# Patient Record
Sex: FEMALE | Race: WHITE | NOT HISPANIC OR LATINO | Employment: UNEMPLOYED | URBAN - NONMETROPOLITAN AREA
[De-identification: names, ages, dates, MRNs, and addresses within clinical notes are randomized per-mention and may not be internally consistent; named-entity substitution may affect disease eponyms.]

---

## 2017-02-07 ENCOUNTER — OFFICE VISIT (OUTPATIENT)
Dept: MEDICAL GROUP | Facility: CLINIC | Age: 62
End: 2017-02-07

## 2017-02-07 VITALS
DIASTOLIC BLOOD PRESSURE: 80 MMHG | HEIGHT: 67 IN | RESPIRATION RATE: 16 BRPM | OXYGEN SATURATION: 96 % | SYSTOLIC BLOOD PRESSURE: 130 MMHG | TEMPERATURE: 98.4 F | WEIGHT: 201.75 LBS | HEART RATE: 94 BPM | BODY MASS INDEX: 31.66 KG/M2

## 2017-02-07 DIAGNOSIS — Z78.9 POOR HISTORIAN: ICD-10-CM

## 2017-02-07 DIAGNOSIS — Z12.39 SCREENING FOR BREAST CANCER: ICD-10-CM

## 2017-02-07 DIAGNOSIS — I10 ESSENTIAL HYPERTENSION: ICD-10-CM

## 2017-02-07 DIAGNOSIS — E11.9 TYPE 2 DIABETES MELLITUS WITHOUT COMPLICATION, WITHOUT LONG-TERM CURRENT USE OF INSULIN (HCC): ICD-10-CM

## 2017-02-07 DIAGNOSIS — Z76.89 ESTABLISHING CARE WITH NEW DOCTOR, ENCOUNTER FOR: ICD-10-CM

## 2017-02-07 DIAGNOSIS — Z12.11 SCREEN FOR COLON CANCER: ICD-10-CM

## 2017-02-07 PROCEDURE — 99204 OFFICE O/P NEW MOD 45 MIN: CPT | Performed by: NURSE PRACTITIONER

## 2017-02-07 RX ORDER — HYDROCHLOROTHIAZIDE 25 MG/1
25 TABLET ORAL DAILY
Qty: 90 TAB | Refills: 1 | Status: SHIPPED | OUTPATIENT
Start: 2017-02-07 | End: 2017-08-31 | Stop reason: SDUPTHER

## 2017-02-07 RX ORDER — HYDROCHLOROTHIAZIDE 25 MG/1
25 TABLET ORAL DAILY
COMMUNITY
End: 2017-02-07 | Stop reason: SDUPTHER

## 2017-02-07 RX ORDER — LOSARTAN POTASSIUM 100 MG/1
100 TABLET ORAL DAILY
Qty: 90 TAB | Refills: 1 | Status: SHIPPED | OUTPATIENT
Start: 2017-02-07 | End: 2017-08-31 | Stop reason: SDUPTHER

## 2017-02-07 ASSESSMENT — PATIENT HEALTH QUESTIONNAIRE - PHQ9: CLINICAL INTERPRETATION OF PHQ2 SCORE: 2
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
## 2017-02-07 NOTE — ASSESSMENT & PLAN NOTE
"Patient is a poor historian throughout appointment. She often goes off on inappropriate and unreleated tangents when attempting to discuss medical history. 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 Reports that she is not to be followed by an oncologist, reports \"I am the only person to have her survive this.\" Her surgical history is also unclear. When prompted to know what her goal is for today's appointment, patient does not have a clear answer, however, it does appear she does need refills of her current medications. She does not currently have insurance, she plans to attend a Medicaid information session today. She adamantly denies any history of drug abuse or alcohol abuse. Also denies any psych history.  "
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
## 2017-02-07 NOTE — PATIENT INSTRUCTIONS
Please obtain fasting labs prior to your next appointment. You may report to our clinic here in [place] Monday-Friday from 7:00am - 9:00am.     Please arrive fasting. Please do not eat or drink anything other than water for 8 hours prior to your arrival for labs.     Your medical care was provided today by: CARLOS Ayala    Thank You for the opportunity to serve you.    You may receive a brief survey in the mail shortly regarding your visit today. Please take a few moments to complete the survey and return it; no postage is necessary. We are working to serve our patient population better, improve customer service and our patients overall experience and your input can help us to accomplish this. We thank you for your help and for the opportunity to serve you today and in the future.     Special Instructions:  Always call 9-1-1 immediately if you develop a life threatening emergency.    Unless told otherwise please take all medications as directed and complete prescription therapies.     Watch for the following signs that require additional evaluation: progressive lethargy or unresponsiveness, localized pain (chest, abdomen), shortness of breath, painful breathing, progressive vomiting with weakness, bloody stools, or new rash.     If you are prescribed pain medication or any other medication that is sedating, do not take medication before or while operating a vehicle or heavy machinery or equipment due to potential side effects such as drowsiness and/or dizziness.
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
## 2017-02-07 NOTE — ASSESSMENT & PLAN NOTE
Patient initially reported she was taking metformin 1000 mg twice daily as a result of her history of lymphoma, however, patient then reported later on appointment that she has been diagnosed with type 2 diabetes due to high blood sugars. She has been taking metformin 1000 mg twice a day for several years without complication. Denies any GI upset. Reports good medication compliance. She is in need of a refill today.

## 2017-05-29 ENCOUNTER — APPOINTMENT (OUTPATIENT)
Dept: RADIOLOGY | Facility: MEDICAL CENTER | Age: 62
End: 2017-05-29
Attending: EMERGENCY MEDICINE
Payer: COMMERCIAL

## 2017-05-29 ENCOUNTER — HOSPITAL ENCOUNTER (EMERGENCY)
Facility: MEDICAL CENTER | Age: 62
End: 2017-05-29
Attending: EMERGENCY MEDICINE
Payer: COMMERCIAL

## 2017-05-29 VITALS
HEIGHT: 68 IN | WEIGHT: 169 LBS | RESPIRATION RATE: 18 BRPM | SYSTOLIC BLOOD PRESSURE: 148 MMHG | DIASTOLIC BLOOD PRESSURE: 85 MMHG | HEART RATE: 91 BPM | OXYGEN SATURATION: 95 % | TEMPERATURE: 97.6 F | BODY MASS INDEX: 25.61 KG/M2

## 2017-05-29 DIAGNOSIS — S93.401A SPRAIN OF RIGHT ANKLE, UNSPECIFIED LIGAMENT, INITIAL ENCOUNTER: ICD-10-CM

## 2017-05-29 PROCEDURE — 73630 X-RAY EXAM OF FOOT: CPT | Mod: RT

## 2017-05-29 PROCEDURE — 99284 EMERGENCY DEPT VISIT MOD MDM: CPT

## 2017-05-29 PROCEDURE — 73610 X-RAY EXAM OF ANKLE: CPT | Mod: RT

## 2017-05-29 ASSESSMENT — PAIN SCALES - GENERAL: PAINLEVEL_OUTOF10: 9

## 2017-05-29 ASSESSMENT — LIFESTYLE VARIABLES: DO YOU DRINK ALCOHOL: NO

## 2017-05-29 NOTE — ED AVS SNAPSHOT
Home Care Instructions                                                                                                                Belkis Edward   MRN: [id number]    Department:  [facility], Emergency Dept   Date of Visit:  5/29/2017            [facility], Emergency Dept    [address]    [place] [address]    Phone:  [phone]      You were seen by     Scarlett Lezama M.D.      Your Diagnosis Was     Sprain of right ankle, unspecified ligament, initial encounter     S93.401A       Follow-up Information     1. Follow up with Aubrey Garcia M.D.. Call in 1 day.    Specialty:  Orthopaedics    Why:  for recheck    Contact information    [address]  [address]  [facility] [address]  [phone]        Medication Information     Review all of your home medications and newly ordered medications with your primary doctor and/or pharmacist as soon as possible. Follow medication instructions as directed by your doctor and/or pharmacist.     Please keep your complete medication list with you and share with your physician. Update the information when medications are discontinued, doses are changed, or new medications (including over-the-counter products) are added; and carry medication information at all times in the event of emergency situations.               Medication List      ASK your doctor about these medications        Instructions    Morning Afternoon Evening Bedtime    hydrochlorothiazide 25 MG Tabs   Commonly known as:  HYDRODIURIL        Take 1 Tab by mouth every day.   Dose:  25 mg                        losartan 100 MG Tabs   Commonly known as:  COZAAR        Take 1 Tab by mouth every day.   Dose:  100 mg                        metformin 1000 MG tablet   Commonly known as:  GLUCOPHAGE        Take 1 Tab by mouth 2 times a day, with meals.   Dose:  1000 mg                                Procedures and tests performed during your visit     DX-ANKLE 3+ VIEWS RIGHT    DX-FOOT-COMPLETE 3+ RIGHT    NURSING COMMUNICATION        Discharge Instructions       Ankle Sprain  An ankle sprain is an injury to the strong, fibrous tissues (ligaments) that hold the bones of your ankle joint together.   CAUSES  An ankle sprain is usually caused by a fall or by twisting your ankle. Ankle sprains most commonly occur when you step on the outer edge of your foot, and your ankle turns inward. People who participate in sports are more prone to these types of injuries.   SYMPTOMS   · Pain in your ankle. The pain may be present at rest or only when you are trying to stand or walk.  · Swelling.  · Bruising. Bruising may develop immediately or within 1 to 2 days after your injury.  · Difficulty standing or walking, particularly when turning corners or changing directions.  DIAGNOSIS   Your caregiver will ask you details about your injury and perform a physical exam of your ankle to determine if you have an ankle sprain. During the physical exam, your caregiver will press on and apply pressure to specific areas of your foot and ankle. Your caregiver will try to move your ankle in certain ways. An X-ray exam may be done to be sure a bone was not broken or a ligament did not separate from one of the bones in your ankle (avulsion fracture).   TREATMENT   Certain types of braces can help stabilize your ankle. Your caregiver can make a recommendation for this. Your caregiver may recommend the use of medicine for pain. If your sprain is severe, your caregiver may refer you to a surgeon who helps to restore function to parts of your skeletal system (orthopedist) or a physical therapist.  HOME CARE INSTRUCTIONS   · Apply ice to your injury for 1-2 days or as directed by your caregiver. Applying ice helps to reduce inflammation and pain.  ¨ Put ice in a plastic bag.  ¨ Place a towel between your skin and the bag.  ¨ Leave the ice on for 15-20 minutes at a time, every 2 hours while you are awake.  · Only take  over-the-counter or prescription medicines for pain, discomfort, or fever as directed by your caregiver.  · Elevate your injured ankle above the level of your heart as much as possible for 2-3 days.  · If your caregiver recommends crutches, use them as instructed. Gradually put weight on the affected ankle. Continue to use crutches or a cane until you can walk without feeling pain in your ankle.  · If you have a plaster splint, wear the splint as directed by your caregiver. Do not rest it on anything harder than a pillow for the first 24 hours. Do not put weight on it. Do not get it wet. You may take it off to take a shower or bath.  · You may have been given an elastic bandage to wear around your ankle to provide support. If the elastic bandage is too tight (you have numbness or tingling in your foot or your foot becomes cold and blue), adjust the bandage to make it comfortable.  · If you have an air splint, you may blow more air into it or let air out to make it more comfortable. You may take your splint off at night and before taking a shower or bath. Wiggle your toes in the splint several times per day to decrease swelling.  SEEK MEDICAL CARE IF:   · You have rapidly increasing bruising or swelling.  · Your toes feel extremely cold or you lose feeling in your foot.  · Your pain is not relieved with medicine.  SEEK IMMEDIATE MEDICAL CARE IF:  · Your toes are numb or blue.  · You have severe pain that is increasing.  MAKE SURE YOU:   · Understand these instructions.  · Will watch your condition.  · Will get help right away if you are not doing well or get worse.     This information is not intended to replace advice given to you by your health care provider. Make sure you discuss any questions you have with your health care provider.     Document Released: 12/18/2006 Document Revised: 01/08/2016 Document Reviewed: 12/29/2012  [employer] Interactive Patient Education ©2016 [employer]            Patient Information      Patient Information    Following emergency treatment: all patient requiring follow-up care must return either to a private physician or a clinic if your condition worsens before you are able to obtain further medical attention, please return to the emergency room.     Billing Information    At [facility], we work to make the billing process streamlined for our patients.  Our Representatives are here to answer any questions you may have regarding your hospital bill.  If you have insurance coverage and have supplied your insurance information to us, we will submit a claim to your insurer on your behalf.  Should you have any questions regarding your bill, we can be reached online or by phone as follows:  Online: You are able pay your bills online or live chat with our representatives about any billing questions you may have. We are here to help Monday - Friday from 8:00am to 7:30pm and 9:00am - 12:00pm on Saturdays.  Please visit [email or web address]  for more information.   Phone:  [phone] or [phone]    Please note that your emergency physician, surgeon, pathologist, radiologist, anesthesiologist, and other specialists are not employed by [facility] and will therefore bill separately for their services.  Please contact them directly for any questions concerning their bills at the numbers below:     Emergency Physician Services:  [phone]  [facility]:  [phone]  [facility]:  [phone]  [facility]:  [phone]    1. Your final bill may vary from the amount quoted upon discharge if all procedures are not complete at that time, or if your doctor has additional procedures of which we are not aware. You will receive an additional bill if you return to the Emergency Department at [facility] for suture removal regardless of the facility of which the sutures were placed.     2. Please arrange for settlement of  this account at the emergency registration.    3. All self-pay accounts are due in full at the time of treatment.  If you are unable to meet this obligation then payment is expected within 4-5 days.     4. If you have had radiology studies (CT, X-ray, Ultrasound, MRI), you have received a preliminary result during your emergency department visit. Please contact the radiology department [phone] to receive a copy of your final result. Please discuss the Final result with your primary physician or with the follow up physician provided.     Crisis Hotline:  [place] Crisis Hotline:  1-203-SLCPTPI or 1-408.749.8864  [place] Crisis Hotline:    1-625.364.1716 or 345-386-6554         ED Discharge Follow Up Questions    1. In order to provide you with very good care, we would like to follow up with a phone call in the next few days.  May we have your permission to contact you?     YES /  NO    2. What is the best phone number to call you? (       )_____-__________    3. What is the best time to call you?      Morning  /  Afternoon  /  Evening                   Patient Signature:  ____________________________________________________________    Date:  ____________________________________________________________

## 2017-05-29 NOTE — ED AVS SNAPSHOT
Finicity Access Code: [email or web address]  Expires: 6/28/2017 11:00 PM    Your email address is not on file at [facility].  Email Addresses are required for you to sign up for Finicity, please contact [phone] to verify your personal information and to provide your email address prior to attempting to register for Finicity.    Belkis Edward  [address] [place] [id number]    Finicity  A secure, online tool to manage your health information     [facility]’s Finicity® is a secure, online tool that connects you to your personalized health information from the privacy of your home -- day or night - making it very easy for you to manage your healthcare. Once the activation process is completed, you can even access your medical information using the Finicity ted, which is available for free in the Apple Ted store or Google Play store.     To learn more about Finicity, visit [email or web address]    There are two levels of access available (as shown below):   My Chart Features  [facility] Primary Care Doctor [facility]  Specialists [facility]  Urgent  Care Non-[facility] Primary Care Doctor   Email your healthcare team securely and privately 24/7 X X X    Manage appointments: schedule your next appointment; view details of past/upcoming appointments X      Request prescription refills. X      View recent personal medical records, including lab and immunizations X X X X   View health record, including health history, allergies, medications X X X X   Read reports about your outpatient visits, procedures, consult and ER notes X X X X   See your discharge summary, which is a recap of your hospital and/or ER visit that includes your diagnosis, lab results, and care plan X X  X     How to register for [employer]:  Once your e-mail address has been verified, follow the following steps to sign up for Finicity.     1. Go to  [email or web address]  2. Click on the Sign Up Now box, which takes you to the New Member Sign Up page.  You will need to provide the following information:  a. Enter your [employer] Access Code exactly as it appears at the top of this page. (You will not need to use this code after you’ve completed the sign-up process. If you do not sign up before the expiration date, you must request a new code.)   b. Enter your date of birth.   c. Enter your home email address.   d. Click Submit, and follow the next screen’s instructions.  3. Create a Healthonomyt ID. This will be your Nominum login ID and cannot be changed, so think of one that is secure and easy to remember.  4. Create a Nominum password. You can change your password at any time.  5. Enter your Password Reset Question and Answer. This can be used at a later time if you forget your password.   6. Enter your e-mail address. This allows you to receive e-mail notifications when new information is available in Nominum.  7. Click Sign Up. You can now view your health information.    For assistance activating your Nominum account, call [phone]

## 2017-05-29 NOTE — ED AVS SNAPSHOT
5/29/2017    Belkis Edward  [address] [address]    Dear Belkis:    [facility] wants to ensure your discharge home is safe and you or your loved ones have had all of your questions answered regarding your care after you leave the hospital.    Below is a list of resources and contact information should you have any questions regarding your hospital stay, follow-up instructions, or active medical symptoms.    Questions or Concerns Regarding… Contact   Medical Questions Related to Your Discharge  (7 days a week, 8am-5pm) Contact a Nurse Care Coordinator   [phone]   Medical Questions Not Related to Your Discharge  (24 hours a day / 7 days a week)  Contact the Nurse Health Line   [phone]    Medications or Discharge Instructions Refer to your discharge packet   or contact your [facility] Primary Care Provider   [phone]   Follow-up Appointment(s) Schedule your appointment via [employer]   or contact Scheduling [phone]   Billing Review your statement via [employer]  or contact Billing [phone]   Medical Records Review your records via [employer]   or contact Medical Records [phone]     You may receive a telephone call within two days of discharge. This call is to make certain you understand your discharge instructions and have the opportunity to have any questions answered. You can also easily access your medical information, test results and upcoming appointments via the [employer] free online health management tool. You can learn more and sign up at [employer]. For assistance setting up your [employer] account, please call [phone].    Once again, we want to ensure your discharge home is safe and that you have a clear understanding of any next steps in your care. If you have any questions or concerns, please do not hesitate to contact us, we are here for you. Thank you for choosing [facility] for your healthcare needs.    Sincerely,    Your [facility] Healthcare Team

## 2017-05-30 NOTE — ED NOTES
Patient was educated on discharge instructions.  Patient was informed about diagnosis, symptom management, risks, and home care instructions.  Patient verbalized understanding and signed discharge instructions.  Copy of discharge instructions in chart.  Patient wheeled by RN and family.  Patient has personal belongings, crutches and splint.

## 2017-05-30 NOTE — DISCHARGE INSTRUCTIONS
Ankle Sprain  An ankle sprain is an injury to the strong, fibrous tissues (ligaments) that hold the bones of your ankle joint together.   CAUSES  An ankle sprain is usually caused by a fall or by twisting your ankle. Ankle sprains most commonly occur when you step on the outer edge of your foot, and your ankle turns inward. People who participate in sports are more prone to these types of injuries.   SYMPTOMS   · Pain in your ankle. The pain may be present at rest or only when you are trying to stand or walk.  · Swelling.  · Bruising. Bruising may develop immediately or within 1 to 2 days after your injury.  · Difficulty standing or walking, particularly when turning corners or changing directions.  DIAGNOSIS   Your caregiver will ask you details about your injury and perform a physical exam of your ankle to determine if you have an ankle sprain. During the physical exam, your caregiver will press on and apply pressure to specific areas of your foot and ankle. Your caregiver will try to move your ankle in certain ways. An X-ray exam may be done to be sure a bone was not broken or a ligament did not separate from one of the bones in your ankle (avulsion fracture).   TREATMENT   Certain types of braces can help stabilize your ankle. Your caregiver can make a recommendation for this. Your caregiver may recommend the use of medicine for pain. If your sprain is severe, your caregiver may refer you to a surgeon who helps to restore function to parts of your skeletal system (orthopedist) or a physical therapist.  HOME CARE INSTRUCTIONS   · Apply ice to your injury for 1-2 days or as directed by your caregiver. Applying ice helps to reduce inflammation and pain.  ¨ Put ice in a plastic bag.  ¨ Place a towel between your skin and the bag.  ¨ Leave the ice on for 15-20 minutes at a time, every 2 hours while you are awake.  · Only take over-the-counter or prescription medicines for pain, discomfort, or fever as directed by  your caregiver.  · Elevate your injured ankle above the level of your heart as much as possible for 2-3 days.  · If your caregiver recommends crutches, use them as instructed. Gradually put weight on the affected ankle. Continue to use crutches or a cane until you can walk without feeling pain in your ankle.  · If you have a plaster splint, wear the splint as directed by your caregiver. Do not rest it on anything harder than a pillow for the first 24 hours. Do not put weight on it. Do not get it wet. You may take it off to take a shower or bath.  · You may have been given an elastic bandage to wear around your ankle to provide support. If the elastic bandage is too tight (you have numbness or tingling in your foot or your foot becomes cold and blue), adjust the bandage to make it comfortable.  · If you have an air splint, you may blow more air into it or let air out to make it more comfortable. You may take your splint off at night and before taking a shower or bath. Wiggle your toes in the splint several times per day to decrease swelling.  SEEK MEDICAL CARE IF:   · You have rapidly increasing bruising or swelling.  · Your toes feel extremely cold or you lose feeling in your foot.  · Your pain is not relieved with medicine.  SEEK IMMEDIATE MEDICAL CARE IF:  · Your toes are numb or blue.  · You have severe pain that is increasing.  MAKE SURE YOU:   · Understand these instructions.  · Will watch your condition.  · Will get help right away if you are not doing well or get worse.     This information is not intended to replace advice given to you by your health care provider. Make sure you discuss any questions you have with your health care provider.     Document Released: 12/18/2006 Document Revised: 01/08/2016 Document Reviewed: 12/29/2012  [employer] Interactive Patient Education ©2016 [employer]

## 2017-05-30 NOTE — ED NOTES
Belkis Edward  Chief Complaint   Patient presents with   • Ankle Injury     pt twisted ankle yesterday,  redness and swelling noted.  pain with wt bearing, decreased sensation and movement.       Pt to triage in w/c with above complaint. VSS.    Pt returned to lobby, educated on triage process, and to inform staff of any changes or concerns.

## 2017-05-30 NOTE — ED NOTES
Pt c/o right ankle pain after twisting it yesterday.  Pt states there is numbness on right toes.  Pain is described as 8/10 constant, sharp, and aching.  Pt has minimal movement of right toes.  R medial ankle is edematous, pulses equal bilaterally.

## 2017-05-30 NOTE — ED PROVIDER NOTES
ED Provider Note    Scribed for Scarlett Lezama M.D. by Graeme Barron. 5/29/2017  10:07 PM    Primary care provider: JEWELL Zhao  Means of arrival: Wheel Chair  History obtained from: Patient  History limited by: None    CHIEF COMPLAINT  Chief Complaint   Patient presents with   • Ankle Injury     pt twisted ankle yesterday,  redness and swelling noted.  pain with wt bearing, decreased sensation and movement.         HPI  Belkis Edward is a 61 y.o. female who presents to the Emergency Department due to constant right ankle pain after twisting her ankle yesterday. Per nurse's notes, her ankle pain is an 8/10 in severity and described as sharp and aching in nature. Per patient, she noticed swelling tonight after walking around [employer]. She also reports mild right foot pain and right knee pain onset today. Per patient, she has difficulties ambulating tonight secondary to the pain. Per nurse's notes, the patient has associated symptoms of numbness to her right toes. The patient is allergic to most antibiotics and reports taking hydrochlorothiazide, metformin, and losartan on a regular basis. She does not report any alleviating factors and denies taking pain medication. The patient does not want to be treated with pain medication. Denies any other musculoskeletal injuries.      REVIEW OF SYSTEMS  Neuro: Numbness to right toes.   Musculoskeletal: Right ankle pain and swelling, right foot pain, and right knee pain. No other musculoskeletal injuries.     See history of present illness.   E    PAST MEDICAL HISTORY   has a past medical history of Lymphoma (CMS-HCC); Headaches due to old head injury; Diabetes (CMS-HCC); and Hypertension.    SURGICAL HISTORY   has past surgical history that includes abdominal exploration (2015) and hysterectomy, total abdominal.    SOCIAL HISTORY  Social History   Substance Use Topics   • Smoking status: Former Smoker -- 2 years     Types: Cigarettes     Quit date: 01/01/1997  "  • Smokeless tobacco: Never Used   • Alcohol Use: No      History   Drug Use No       FAMILY HISTORY  Family History   Problem Relation Age of Onset   • Cancer Mother    • Alzheimer's Disease Father    • Cancer Brother        CURRENT MEDICATIONS  Home Medications     Reviewed by Ramonita Casiano R.N. (Registered Nurse) on 05/29/17 at 2158  Med List Status: Partial    Medication Last Dose Status    hydrochlorothiazide (HYDRODIURIL) 25 MG Tab  Active    losartan (COZAAR) 100 MG Tab  Active    metformin (GLUCOPHAGE) 1000 MG tablet  Active                ALLERGIES  Allergies   Allergen Reactions   • Sulfa Drugs Anaphylaxis   • Pcn [Penicillins] Anaphylaxis       PHYSICAL EXAM  VITAL SIGNS: /98 mmHg  Pulse 98  Temp(Src) 36.4 °C (97.6 °F)  Resp 16  Ht 1.727 m (5' 8\")  Wt 76.658 kg (169 lb)  BMI 25.70 kg/m2  SpO2 95%    Constitutional: No distress  Skin: pink warm and dry with slight lateral ankle contusion  Musculoskeletal: Soft tissue swelling to right ankle and right foot, no obvious bony step offs or crepitance, No right knee swelling or tenderness no ligamentous instability on ligamentous stress  Vascular: warm to touch good capillary refill   Neurologic: distally neurovascularly intact  Psychiatric: Affect normal    DIAGNOSTIC STUDIES/PROCEDURES    RADIOLOGY  DX-FOOT-COMPLETE 3+ RIGHT   Final Result      No evidence of acute fracture or dislocation.      Calcaneal spurring.      DX-ANKLE 3+ VIEWS RIGHT   Final Result      No evidence of fracture or dislocation.      Calcaneal spurring.      Soft tissue swelling.        The radiologist's interpretation of all radiological studies have been reviewed by me.    COURSE & MEDICAL DECISION MAKING  Pertinent Labs & Imaging studies reviewed. (See chart for details)    10:07 PM - Patient seen and examined at bedside. Ordered right ankle x-ray and right foot x-ray to evaluate her symptoms. Differential diagnoses include but are not limited to fracture versus " "sprain.    10:53 PM- Reviewed x-rays, which are overall unremarkable.     10:54 PM - Re-examined; The patient is resting in bed comfortably. I discussed her above findings were overall unremarkable. I discussed plans for discharge and instructed patient to keep her foot elevate and use ice. She was given a referral to Dr. Garcia (Ortho) and instructed to return to the ED if her symptoms worsen. Patient understands and agrees. Her vitals prior to discharge are: /98 mmHg  Pulse 98  Temp(Src) 36.4 °C (97.6 °F)  Resp 16  Ht 1.727 m (5' 8\")  Wt 76.658 kg (169 lb)  BMI 25.70 kg/m2  SpO2 95%    The patient will return for new or worsening symptoms and is stable at the time of discharge.    DISPOSITION:  Patient will be discharged home in stable condition.    FOLLOW UP:  Aubrey Garcia M.D.  [address] [facility] [address]  [address]  [facility] [address]  [phone]    Call in 1 day  for recheck      OUTPATIENT MEDICATIONS:  New Prescriptions    No medications on file         FINAL IMPRESSION  1. Sprain of right ankle, unspecified ligament, initial encounter          IGraeme (Jeffibe), am scribing for, and in the presence of, Scarlett Lezama M.D..    Electronically signed by: Graeme Barron (Jeffibyeny), 5/29/2017    Scarlett ZIEGLER M.D. personally performed the services described in this documentation, as scribed by Graeme Barron in my presence, and it is both accurate and complete.      The note accurately reflects work and decisions made by me.  Scarlett Lezama  5/29/2017  11:12 PM          "

## 2017-08-31 ENCOUNTER — OFFICE VISIT (OUTPATIENT)
Dept: URGENT CARE | Facility: PHYSICIAN GROUP | Age: 62
End: 2017-08-31

## 2017-08-31 ENCOUNTER — OFFICE VISIT (OUTPATIENT)
Dept: MEDICAL GROUP | Facility: CLINIC | Age: 62
End: 2017-08-31

## 2017-08-31 ENCOUNTER — APPOINTMENT (OUTPATIENT)
Dept: RADIOLOGY | Facility: IMAGING CENTER | Age: 62
End: 2017-08-31
Attending: PHYSICIAN ASSISTANT

## 2017-08-31 VITALS
BODY MASS INDEX: 29.7 KG/M2 | WEIGHT: 196 LBS | SYSTOLIC BLOOD PRESSURE: 132 MMHG | TEMPERATURE: 97.4 F | HEIGHT: 68 IN | DIASTOLIC BLOOD PRESSURE: 84 MMHG | RESPIRATION RATE: 18 BRPM | OXYGEN SATURATION: 96 % | HEART RATE: 82 BPM

## 2017-08-31 VITALS
WEIGHT: 196 LBS | SYSTOLIC BLOOD PRESSURE: 164 MMHG | HEART RATE: 88 BPM | BODY MASS INDEX: 29.7 KG/M2 | HEIGHT: 68 IN | DIASTOLIC BLOOD PRESSURE: 98 MMHG | RESPIRATION RATE: 16 BRPM | TEMPERATURE: 97.9 F | OXYGEN SATURATION: 98 %

## 2017-08-31 DIAGNOSIS — R05.9 COUGH: ICD-10-CM

## 2017-08-31 DIAGNOSIS — R09.81 NASAL CONGESTION: ICD-10-CM

## 2017-08-31 DIAGNOSIS — R40.0 SLEEPINESS: ICD-10-CM

## 2017-08-31 DIAGNOSIS — R07.1 INSPIRATORY PAIN: ICD-10-CM

## 2017-08-31 DIAGNOSIS — Z78.9 POOR HISTORIAN: ICD-10-CM

## 2017-08-31 DIAGNOSIS — R06.02 SOB (SHORTNESS OF BREATH): ICD-10-CM

## 2017-08-31 PROBLEM — Z76.89 ESTABLISHING CARE WITH NEW DOCTOR, ENCOUNTER FOR: Status: RESOLVED | Noted: 2017-02-07 | Resolved: 2017-08-31

## 2017-08-31 PROCEDURE — 71020 DX-CHEST-2 VIEWS: CPT | Mod: TC | Performed by: PHYSICIAN ASSISTANT

## 2017-08-31 PROCEDURE — 99213 OFFICE O/P EST LOW 20 MIN: CPT | Performed by: PHYSICIAN ASSISTANT

## 2017-08-31 PROCEDURE — 99213 OFFICE O/P EST LOW 20 MIN: CPT | Performed by: NURSE PRACTITIONER

## 2017-08-31 RX ORDER — LOSARTAN POTASSIUM 100 MG/1
100 TABLET ORAL DAILY
Qty: 90 TAB | Refills: 0 | Status: SHIPPED | OUTPATIENT
Start: 2017-08-31 | End: 2018-03-29

## 2017-08-31 RX ORDER — HYDROCHLOROTHIAZIDE 25 MG/1
25 TABLET ORAL DAILY
Qty: 90 TAB | Refills: 0 | Status: SHIPPED | OUTPATIENT
Start: 2017-08-31 | End: 2017-11-08 | Stop reason: SDUPTHER
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
## 2017-08-31 NOTE — ASSESSMENT & PLAN NOTE
Patient reports she was recently .     This is a new problem.  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 Denies any SOB, fever. Reports she may have been exposed to a mold in her new husbands home?     Reports she was previously treated at [facility], where she reports she was told 'we have a machine that keeps running until it finds the answer. '     Today she reports she needs a Z-yun to cure her illness. Her symptoms are unclear even after speaking with patient for over 30 minutes.

## 2017-08-31 NOTE — PATIENT INSTRUCTIONS
Your medical care was provided today by: CARLOS Ayala    Thank You for the opportunity to serve you.    You may receive a brief survey in the mail shortly regarding your visit today. Please take a few moments to complete the survey and return it; no postage is necessary. We are working to serve our patient population better, improve customer service and our patients overall experience and your input can help us to accomplish this. We thank you for your help and for the opportunity to serve you today and in the future.     Special Instructions:  Always call 9-1-1 immediately if you develop a life threatening emergency.    Unless told otherwise please take all medications as directed and complete prescription therapies.     Watch for the following signs that require additional evaluation: progressive lethargy or unresponsiveness, localized pain (chest, abdomen), shortness of breath, painful breathing, progressive vomiting with weakness, bloody stools, or new rash.     If you are prescribed pain medication or any other medication that is sedating, do not take medication before or while operating a vehicle or heavy machinery or equipment due to potential side effects such as drowsiness and/or dizziness.
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
## 2017-09-01 NOTE — PROGRESS NOTES
"Chief Complaint   Patient presents with   • Cough       HISTORY OF PRESENT ILLNESS: Patient is a 61 y.o. female who presents today for evaluation of possible legionnaires. Patient reports having the disease 8-10 years ago. She crawled in her house 48 hours ago and found \"rotten wood and soupy stuff.\" Patient immediately started having nasal congestion and her left eye was \"goopy.\" She reports an intermittent dry cough. She has difficulty staying awake and has had intermittent confusion over the last 12 hours that she feels is getting worse. She states \"my coordination is weird.\" She states \"I feel a fever coming on.\" She complains of shortness of breath and pain with inspiration. She denies nausea, vomiting, diarrhea, and has not taken any over-the-counter medication. She was in [place] 5 weeks ago getting  but denies any unusual activity.    Patient Active Problem List    Diagnosis Date Noted   • Cough 08/31/2017   • Essential hypertension 02/07/2017   • Type 2 diabetes mellitus without complication, without long-term current use of insulin (CMS-HCC) 02/07/2017   • Poor historian 02/07/2017       Allergies:Sulfa drugs; Levaquin; and Pcn [penicillins]    Current Outpatient Prescriptions Ordered in [facility]   Medication Sig Dispense Refill   • hydrochlorothiazide (HYDRODIURIL) 25 MG Tab Take 1 Tab by mouth every day. 90 Tab 0   • losartan (COZAAR) 100 MG Tab Take 1 Tab by mouth every day. 90 Tab 0   • metformin (GLUCOPHAGE) 1000 MG tablet Take 1 Tab by mouth 2 times a day, with meals. 180 Tab 0     No current Epic-ordered facility-administered medications on file.        Past Medical History:   Diagnosis Date   • Diabetes (CMS-HCC)    • Headaches due to old head injury    • Hypertension    • Lymphoma (CMS-HCC)     per patient, 2001, possible T-cell?       Social History   Substance Use Topics   • Smoking status: Former Smoker     Years: 2.00     Types: Cigarettes     Quit date: 1/1/1997   • Smokeless tobacco: Never " "Used   • Alcohol use No       Family Status   Relation Status   • Mother    • Father  at age 74   • Brother  at age 32     Family History   Problem Relation Age of Onset   • Cancer Mother    • Alzheimer's Disease Father    • Cancer Brother        ROS:    Review of Systems   Constitutional: Negative for fever, chills, weight loss and malaise/fatigue.   HENT: Negative for ear pain, nosebleeds, congestion, sore throat and neck pain.    Eyes: Negative for blurred vision.   Respiratory: Negative for sputum production,  and wheezing.    Cardiovascular: Negative for chest pain, palpitations, orthopnea and leg swelling.   Gastrointestinal: Negative for heartburn, nausea, vomiting and abdominal pain.   Genitourinary: Negative for dysuria, urgency and frequency.       Exam:  Blood pressure (!) 164/98, pulse 88, temperature 36.6 °C (97.9 °F), resp. rate 16, height 1.727 m (5' 8\"), weight 88.9 kg (196 lb), SpO2 98 %.  General: Well developed, well nourished. No distress.  HEENT: Conjunctiva clear, lids without ptosis, PERRL/EOMI. Ears normal shape and contour, canals are clear bilaterally, tympanic membranes are benign. Nasal mucosa benign. Oropharynx is without erythema, edema or exudates. Reasonable dentition.  Neck: Trachea midline, no masses. No thyromegaly.  Pulmonary: Clear to ausculation and percussion.  Normal effort. No rales, ronchi, or wheezing.   Cardiovascular: Regular rate and rhythm without murmur. No edema.   Abdomen: Soft, non-tender, nondistended. No hepatosplenomegaly.   Neurologic: Grossly nonfocal. Balance and coordination seem appropriate.  Skin: Warm, dry, good turgor. No rashes in visible areas.   Psych: Normal mood. Alert and oriented x3. Judgment and insight is normal.    CXR, per radiology:  Impression       No active disease     Assessment/Plan:  Chest x-ray results were not available until the day following clinic visit. The patient on the telephone regarding results. Advised " that this appears to be some sort of allergic reaction to whatever she was exposed to underneath her house. Discussed appropriate over-the-counter symptomatic medication for this. Patient is still quite concerned that she has legionnaires. Advise having blood work done and discussed ER precautions for any worsening symptoms. Patient verbalizes understanding and agrees with plan of care.  1. SOB (shortness of breath)  DX-CHEST-2 VIEWS    CBC WITH DIFFERENTIAL    COMP METABOLIC PANEL   2. Inspiratory pain  DX-CHEST-2 VIEWS    CBC WITH DIFFERENTIAL    COMP METABOLIC PANEL   3. Sleepiness  CBC WITH DIFFERENTIAL    COMP METABOLIC PANEL   4. Nasal congestion     5. Cough  CBC WITH DIFFERENTIAL    COMP METABOLIC PANEL

## 2017-11-09 RX ORDER — HYDROCHLOROTHIAZIDE 25 MG/1
TABLET ORAL
Qty: 90 TAB | Refills: 0 | Status: SHIPPED | OUTPATIENT
Start: 2017-11-09 | End: 2018-03-29

## 2018-02-17 ENCOUNTER — HOSPITAL ENCOUNTER (OUTPATIENT)
Dept: LAB | Facility: MEDICAL CENTER | Age: 63
End: 2018-02-17
Attending: NURSE PRACTITIONER
Payer: COMMERCIAL

## 2018-02-17 DIAGNOSIS — R05.9 COUGH: ICD-10-CM

## 2018-02-17 LAB
BASOPHILS # BLD AUTO: 1.3 % (ref 0–1.8)
BASOPHILS # BLD: 0.09 K/UL (ref 0–0.12)
EOSINOPHIL # BLD AUTO: 0.21 K/UL (ref 0–0.51)
EOSINOPHIL NFR BLD: 2.9 % (ref 0–6.9)
ERYTHROCYTE [DISTWIDTH] IN BLOOD BY AUTOMATED COUNT: 40.6 FL (ref 35.9–50)
HCT VFR BLD AUTO: 43.9 % (ref 37–47)
HGB BLD-MCNC: 14.4 G/DL (ref 12–16)
IMM GRANULOCYTES # BLD AUTO: 0.02 K/UL (ref 0–0.11)
IMM GRANULOCYTES NFR BLD AUTO: 0.3 % (ref 0–0.9)
LYMPHOCYTES # BLD AUTO: 2.18 K/UL (ref 1–4.8)
LYMPHOCYTES NFR BLD: 30.6 % (ref 22–41)
MCH RBC QN AUTO: 28.5 PG (ref 27–33)
MCHC RBC AUTO-ENTMCNC: 32.8 G/DL (ref 33.6–35)
MCV RBC AUTO: 86.9 FL (ref 81.4–97.8)
MONOCYTES # BLD AUTO: 0.59 K/UL (ref 0–0.85)
MONOCYTES NFR BLD AUTO: 8.3 % (ref 0–13.4)
NEUTROPHILS # BLD AUTO: 4.04 K/UL (ref 2–7.15)
NEUTROPHILS NFR BLD: 56.6 % (ref 44–72)
NRBC # BLD AUTO: 0 K/UL
NRBC BLD-RTO: 0 /100 WBC
PLATELET # BLD AUTO: 245 K/UL (ref 164–446)
PMV BLD AUTO: 11.5 FL (ref 9–12.9)
RBC # BLD AUTO: 5.05 M/UL (ref 4.2–5.4)
WBC # BLD AUTO: 7.1 K/UL (ref 4.8–10.8)

## 2018-02-17 PROCEDURE — 36415 COLL VENOUS BLD VENIPUNCTURE: CPT

## 2018-02-17 PROCEDURE — 85025 COMPLETE CBC W/AUTO DIFF WBC: CPT

## 2018-02-23 ENCOUNTER — OFFICE VISIT (OUTPATIENT)
Dept: URGENT CARE | Facility: PHYSICIAN GROUP | Age: 63
End: 2018-02-23
Payer: COMMERCIAL

## 2018-02-23 VITALS
RESPIRATION RATE: 16 BRPM | TEMPERATURE: 98.3 F | DIASTOLIC BLOOD PRESSURE: 80 MMHG | HEART RATE: 84 BPM | SYSTOLIC BLOOD PRESSURE: 122 MMHG | OXYGEN SATURATION: 97 % | HEIGHT: 68 IN | WEIGHT: 195 LBS | BODY MASS INDEX: 29.55 KG/M2

## 2018-02-23 DIAGNOSIS — Z79.4 TYPE 2 DIABETES MELLITUS WITH COMPLICATION, WITH LONG-TERM CURRENT USE OF INSULIN (HCC): ICD-10-CM

## 2018-02-23 DIAGNOSIS — I10 HYPERTENSION, UNSPECIFIED TYPE: ICD-10-CM

## 2018-02-23 DIAGNOSIS — E11.8 TYPE 2 DIABETES MELLITUS WITH COMPLICATION, WITH LONG-TERM CURRENT USE OF INSULIN (HCC): ICD-10-CM

## 2018-02-23 PROCEDURE — 99213 OFFICE O/P EST LOW 20 MIN: CPT | Performed by: EMERGENCY MEDICINE

## 2018-02-23 RX ORDER — HYDROCHLOROTHIAZIDE 25 MG/1
25 TABLET ORAL DAILY
Qty: 30 TAB | Refills: 1 | Status: SHIPPED | OUTPATIENT
Start: 2018-02-23 | End: 2018-03-29 | Stop reason: SDUPTHER

## 2018-02-23 RX ORDER — LOSARTAN POTASSIUM 100 MG/1
100 TABLET ORAL DAILY
Qty: 30 TAB | Refills: 1 | Status: SHIPPED | OUTPATIENT
Start: 2018-02-23 | End: 2018-03-29 | Stop reason: SDUPTHER

## 2018-02-23 ASSESSMENT — ENCOUNTER SYMPTOMS
SPEECH CHANGE: 0
COUGH: 0
ABDOMINAL PAIN: 0
CHILLS: 0
STRIDOR: 0
NECK PAIN: 0
NERVOUS/ANXIOUS: 0
BACK PAIN: 0
HEMOPTYSIS: 0
VOMITING: 0
SENSORY CHANGE: 0
NAUSEA: 0
EYE REDNESS: 0
FEVER: 0
HEADACHES: 0
EYE DISCHARGE: 0

## 2018-02-24 NOTE — PROGRESS NOTES
Subjective:      Belkis Montoya is a 62 y.o. female who presents with Medication Refill            HPI  Patient is a pleasant 62-year-old female who could not get her car started in time to make her appointment at 4 PM so her appointment was canceled but she needs medication refills and came to the urgent care. Patient has diabetes and hypertension.    Allergies   Allergen Reactions   • Sulfa Drugs Anaphylaxis   • Levaquin    • Pcn [Penicillins] Anaphylaxis     Social History     Social History   • Marital status: Single     Spouse name: N/A   • Number of children: N/A   • Years of education: N/A     Occupational History   • Not on file.     Social History Main Topics   • Smoking status: Former Smoker     Years: 2.00     Types: Cigarettes     Quit date: 1/1/1997   • Smokeless tobacco: Never Used   • Alcohol use No   • Drug use: No   • Sexual activity: Yes     Partners: Male     Other Topics Concern   • Not on file     Social History Narrative   • No narrative on file     Past Medical History:   Diagnosis Date   • Diabetes (CMS-HCC)    • Headaches due to old head injury    • Hypertension    • Lymphoma (CMS-HCC)     per patient, 2001, possible T-cell?     Current Outpatient Prescriptions on File Prior to Visit   Medication Sig Dispense Refill   • metformin (GLUCOPHAGE) 1000 MG tablet TAKE ONE TABLET BY MOUTH TWICE DAILY WITH MEALS 180 Tab 0   • hydrochlorothiazide (HYDRODIURIL) 25 MG Tab TAKE ONE TABLET BY MOUTH ONCE DAILY 90 Tab 0   • losartan (COZAAR) 100 MG Tab Take 1 Tab by mouth every day. 90 Tab 0     No current facility-administered medications on file prior to visit.    family history includes Alzheimer's Disease in her father; Cancer in her brother and mother.  Review of Systems   Constitutional: Negative for chills and fever.   Eyes: Negative for discharge and redness.   Respiratory: Negative for cough, hemoptysis and stridor.    Cardiovascular: Negative for chest pain.   Gastrointestinal: Negative for  "abdominal pain, nausea and vomiting.   Musculoskeletal: Negative for back pain and neck pain.   Skin: Negative for rash.   Neurological: Negative for sensory change, speech change and headaches.   Psychiatric/Behavioral: The patient is not nervous/anxious.           Objective:     /80   Pulse 84   Temp 36.8 °C (98.3 °F)   Resp 16   Ht 1.727 m (5' 8\")   Wt 88.5 kg (195 lb)   SpO2 97%   BMI 29.65 kg/m²      Physical Exam   Constitutional: She appears well-developed and well-nourished. No distress.   HENT:   Head: Normocephalic and atraumatic.   Right Ear: External ear normal.   Cardiovascular: Normal rate.    Pulmonary/Chest: Effort normal.   Abdominal: She exhibits no distension.   Musculoskeletal: Normal range of motion.   Neurological: She is alert. Coordination normal.   Skin: Skin is warm and dry. She is not diaphoretic.   Psychiatric: She has a normal mood and affect.               Assessment/Plan:     1. Type 2 diabetes mellitus with complication, with long-term current use of insulin (CMS-HCC)    - metformin (GLUCOPHAGE) 1000 MG tablet; Take 1 Tab by mouth 2 times a day, with meals.  Dispense: 60 Tab; Refill: 1    2. Hypertension, unspecified type    - hydroCHLOROthiazide (HYDRODIURIL) 25 MG Tab; Take 1 Tab by mouth every day.  Dispense: 30 Tab; Refill: 1  - losartan (COZAAR) 100 MG Tab; Take 1 Tab by mouth every day.  Dispense: 30 Tab; Refill: 1    Patient was given enough medications for a month plus a refill on her metformin, hydrochlorothiazide and losartan.  "

## 2018-03-29 ENCOUNTER — HOSPITAL ENCOUNTER (OUTPATIENT)
Dept: LAB | Facility: MEDICAL CENTER | Age: 63
End: 2018-03-29
Attending: NURSE PRACTITIONER
Payer: COMMERCIAL

## 2018-03-29 ENCOUNTER — OFFICE VISIT (OUTPATIENT)
Dept: MEDICAL GROUP | Facility: PHYSICIAN GROUP | Age: 63
End: 2018-03-29
Payer: COMMERCIAL

## 2018-03-29 ENCOUNTER — TELEPHONE (OUTPATIENT)
Dept: MEDICAL GROUP | Facility: PHYSICIAN GROUP | Age: 63
End: 2018-03-29

## 2018-03-29 ENCOUNTER — APPOINTMENT (OUTPATIENT)
Dept: RADIOLOGY | Facility: IMAGING CENTER | Age: 63
End: 2018-03-29
Attending: NURSE PRACTITIONER
Payer: COMMERCIAL

## 2018-03-29 VITALS
HEART RATE: 82 BPM | RESPIRATION RATE: 16 BRPM | HEIGHT: 68 IN | OXYGEN SATURATION: 97 % | TEMPERATURE: 98.1 F | BODY MASS INDEX: 28.79 KG/M2 | SYSTOLIC BLOOD PRESSURE: 138 MMHG | DIASTOLIC BLOOD PRESSURE: 64 MMHG | WEIGHT: 190 LBS

## 2018-03-29 DIAGNOSIS — Z78.9 POOR HISTORIAN: ICD-10-CM

## 2018-03-29 DIAGNOSIS — Z79.4 TYPE 2 DIABETES MELLITUS WITH COMPLICATION, WITH LONG-TERM CURRENT USE OF INSULIN (HCC): ICD-10-CM

## 2018-03-29 DIAGNOSIS — I10 HYPERTENSION, UNSPECIFIED TYPE: ICD-10-CM

## 2018-03-29 DIAGNOSIS — M54.2 NECK PAIN: ICD-10-CM

## 2018-03-29 DIAGNOSIS — E11.8 TYPE 2 DIABETES MELLITUS WITH COMPLICATION, WITH LONG-TERM CURRENT USE OF INSULIN (HCC): ICD-10-CM

## 2018-03-29 DIAGNOSIS — I10 ESSENTIAL HYPERTENSION: ICD-10-CM

## 2018-03-29 DIAGNOSIS — E11.9 TYPE 2 DIABETES MELLITUS WITHOUT COMPLICATION, WITHOUT LONG-TERM CURRENT USE OF INSULIN (HCC): ICD-10-CM

## 2018-03-29 PROCEDURE — 99214 OFFICE O/P EST MOD 30 MIN: CPT | Performed by: NURSE PRACTITIONER

## 2018-03-29 PROCEDURE — 72040 X-RAY EXAM NECK SPINE 2-3 VW: CPT | Mod: TC,FY | Performed by: NURSE PRACTITIONER

## 2018-03-29 RX ORDER — LOSARTAN POTASSIUM 100 MG/1
100 TABLET ORAL DAILY
Qty: 90 TAB | Refills: 1 | Status: SHIPPED | OUTPATIENT
Start: 2018-03-29 | End: 2018-03-29

## 2018-03-29 RX ORDER — HYDROCHLOROTHIAZIDE 25 MG/1
TABLET ORAL
Qty: 90 TAB | Refills: 1 | Status: SHIPPED | OUTPATIENT
Start: 2018-03-29 | End: 2018-03-29

## 2018-03-29 RX ORDER — LOSARTAN POTASSIUM 100 MG/1
100 TABLET ORAL DAILY
Qty: 30 TAB | Refills: 1 | Status: SHIPPED | OUTPATIENT
Start: 2018-03-29 | End: 2018-10-04 | Stop reason: SDUPTHER

## 2018-03-29 RX ORDER — HYDROCHLOROTHIAZIDE 25 MG/1
25 TABLET ORAL DAILY
Qty: 30 TAB | Refills: 1 | Status: SHIPPED | OUTPATIENT
Start: 2018-03-29 | End: 2018-10-04 | Stop reason: SDUPTHER

## 2018-03-29 NOTE — ASSESSMENT & PLAN NOTE
Patient reports this to be a chronic condition, status of control unknown. She is on metformin 1000 mg twice daily but has felt to have her labs done on 2 separate occasions when the labs were ordered since establishing care with the [facility] system in February 2017. Labs were again ordered and she is to have these done before she follows up with me in 4-6 weeks. She tolerates the medication well with no significant bothersome side effects and does need refills. She reports to measuring her fasting blood glucose on a daily basis and states they are normally in the 80s to 90s.
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
## 2018-03-29 NOTE — ASSESSMENT & PLAN NOTE
This is a chronic condition, stable and fairly well-controlled with current medications. She is on losartan and hydrochlorothiazide for which she needs refills. She is due for labs, these were ordered. She tolerates both medications well with no significant bothersome side effects and she does deny symptoms of hypertension.

## 2018-03-29 NOTE — TELEPHONE ENCOUNTER
Please let patient know that x-ray of her cervical spine does show moderate osteoarthritis and degenerative disc disease which is likely causing her pain. We'll see how she does with physical therapy and anti-inflammatories. Interventions do not improve her symptoms, she may need referral to specialist.

## 2018-03-29 NOTE — TELEPHONE ENCOUNTER
Called [phone] voice mail not set up at this time.   Called [phone] left voice message for patient to call [phone] to go over provider notes.
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
## 2018-03-29 NOTE — ASSESSMENT & PLAN NOTE
Pt continues to be a poor historian with regard to her health history and has not provided adequate outside records--please see previous notes in EMR. She is unable give provider names in [place] where she was treated for us to request records. 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 Surgical history is unclear. She does continue to go off on inappropriate and unrelated tangents during visit and requires redirection to focus on goal of the visit. I told her that it is very important that she provides us with her outside records so that we can adequately care for her. I also expressed importance of having her labs done.

## 2018-03-30 ENCOUNTER — HOSPITAL ENCOUNTER (OUTPATIENT)
Dept: LAB | Facility: MEDICAL CENTER | Age: 63
End: 2018-03-30
Attending: NURSE PRACTITIONER
Payer: COMMERCIAL

## 2018-03-30 DIAGNOSIS — I10 ESSENTIAL HYPERTENSION: ICD-10-CM

## 2018-03-30 DIAGNOSIS — E11.9 TYPE 2 DIABETES MELLITUS WITHOUT COMPLICATION, WITHOUT LONG-TERM CURRENT USE OF INSULIN (HCC): ICD-10-CM

## 2018-03-30 LAB
ALBUMIN SERPL BCP-MCNC: 3.7 G/DL (ref 3.2–4.9)
ALBUMIN/GLOB SERPL: 1.3 G/DL
ALP SERPL-CCNC: 63 U/L (ref 30–99)
ALT SERPL-CCNC: 14 U/L (ref 2–50)
ANION GAP SERPL CALC-SCNC: 8 MMOL/L (ref 0–11.9)
AST SERPL-CCNC: 16 U/L (ref 12–45)
BILIRUB SERPL-MCNC: 0.5 MG/DL (ref 0.1–1.5)
BUN SERPL-MCNC: 17 MG/DL (ref 8–22)
CALCIUM SERPL-MCNC: 9 MG/DL (ref 8.5–10.5)
CHLORIDE SERPL-SCNC: 105 MMOL/L (ref 96–112)
CHOLEST SERPL-MCNC: 206 MG/DL (ref 100–199)
CO2 SERPL-SCNC: 27 MMOL/L (ref 20–33)
CREAT SERPL-MCNC: 0.69 MG/DL (ref 0.5–1.4)
CREAT UR-MCNC: 124.5 MG/DL
EST. AVERAGE GLUCOSE BLD GHB EST-MCNC: 197 MG/DL
GLOBULIN SER CALC-MCNC: 2.9 G/DL (ref 1.9–3.5)
GLUCOSE SERPL-MCNC: 195 MG/DL (ref 65–99)
HBA1C MFR BLD: 8.5 % (ref 0–5.6)
HDLC SERPL-MCNC: 43 MG/DL
LDLC SERPL CALC-MCNC: 102 MG/DL
MICROALBUMIN UR-MCNC: <0.7 MG/DL
MICROALBUMIN/CREAT UR: NORMAL MG/G (ref 0–30)
POTASSIUM SERPL-SCNC: 3.9 MMOL/L (ref 3.6–5.5)
PROT SERPL-MCNC: 6.6 G/DL (ref 6–8.2)
SODIUM SERPL-SCNC: 140 MMOL/L (ref 135–145)
TRIGL SERPL-MCNC: 304 MG/DL (ref 0–149)

## 2018-03-30 PROCEDURE — 36415 COLL VENOUS BLD VENIPUNCTURE: CPT

## 2018-03-30 PROCEDURE — 80061 LIPID PANEL: CPT

## 2018-03-30 PROCEDURE — 80053 COMPREHEN METABOLIC PANEL: CPT

## 2018-03-30 PROCEDURE — 82570 ASSAY OF URINE CREATININE: CPT

## 2018-03-30 PROCEDURE — 82043 UR ALBUMIN QUANTITATIVE: CPT

## 2018-03-30 PROCEDURE — 83036 HEMOGLOBIN GLYCOSYLATED A1C: CPT

## 2018-06-07 ENCOUNTER — OFFICE VISIT (OUTPATIENT)
Dept: URGENT CARE | Facility: PHYSICIAN GROUP | Age: 63
End: 2018-06-07
Payer: COMMERCIAL

## 2018-06-07 VITALS
HEART RATE: 84 BPM | HEIGHT: 68 IN | BODY MASS INDEX: 27.89 KG/M2 | DIASTOLIC BLOOD PRESSURE: 68 MMHG | RESPIRATION RATE: 16 BRPM | SYSTOLIC BLOOD PRESSURE: 128 MMHG | OXYGEN SATURATION: 97 % | TEMPERATURE: 98.9 F | WEIGHT: 184 LBS

## 2018-06-07 DIAGNOSIS — L98.9 SKIN SORE: ICD-10-CM

## 2018-06-07 PROCEDURE — 99213 OFFICE O/P EST LOW 20 MIN: CPT | Performed by: NURSE PRACTITIONER

## 2018-06-07 ASSESSMENT — ENCOUNTER SYMPTOMS
MYALGIAS: 0
VOMITING: 0
TINGLING: 0
SENSORY CHANGE: 0
DIARRHEA: 0
FEVER: 0
NAUSEA: 0
FOCAL WEAKNESS: 0
COUGH: 0
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
## 2018-10-04 ENCOUNTER — OFFICE VISIT (OUTPATIENT)
Dept: MEDICAL GROUP | Facility: PHYSICIAN GROUP | Age: 63
End: 2018-10-04
Payer: MEDICARE

## 2018-10-04 VITALS
BODY MASS INDEX: 30.64 KG/M2 | RESPIRATION RATE: 14 BRPM | DIASTOLIC BLOOD PRESSURE: 84 MMHG | HEIGHT: 67 IN | OXYGEN SATURATION: 97 % | HEART RATE: 92 BPM | WEIGHT: 195.2 LBS | SYSTOLIC BLOOD PRESSURE: 136 MMHG | TEMPERATURE: 97.8 F

## 2018-10-04 DIAGNOSIS — Z79.4 TYPE 2 DIABETES MELLITUS WITH COMPLICATION, WITH LONG-TERM CURRENT USE OF INSULIN (HCC): ICD-10-CM

## 2018-10-04 DIAGNOSIS — Z78.9 POOR HISTORIAN: ICD-10-CM

## 2018-10-04 DIAGNOSIS — F43.9 STRESS AT HOME: ICD-10-CM

## 2018-10-04 DIAGNOSIS — I10 ESSENTIAL HYPERTENSION: ICD-10-CM

## 2018-10-04 DIAGNOSIS — E11.8 TYPE 2 DIABETES MELLITUS WITH COMPLICATION, WITH LONG-TERM CURRENT USE OF INSULIN (HCC): ICD-10-CM

## 2018-10-04 DIAGNOSIS — R07.89 OTHER CHEST PAIN: ICD-10-CM

## 2018-10-04 DIAGNOSIS — E11.9 TYPE 2 DIABETES MELLITUS WITHOUT COMPLICATION, WITHOUT LONG-TERM CURRENT USE OF INSULIN (HCC): ICD-10-CM

## 2018-10-04 PROBLEM — R05.9 COUGH: Status: RESOLVED | Noted: 2017-08-31 | Resolved: 2018-10-04

## 2018-10-04 LAB
HBA1C MFR BLD: 7.9 % (ref ?–5.8)
INT CON NEG: NORMAL
INT CON POS: NORMAL

## 2018-10-04 PROCEDURE — 99214 OFFICE O/P EST MOD 30 MIN: CPT | Performed by: NURSE PRACTITIONER

## 2018-10-04 PROCEDURE — 83036 HEMOGLOBIN GLYCOSYLATED A1C: CPT | Performed by: NURSE PRACTITIONER

## 2018-10-04 RX ORDER — LOSARTAN POTASSIUM 100 MG/1
100 TABLET ORAL DAILY
Qty: 90 TAB | Refills: 3 | Status: SHIPPED | OUTPATIENT
Start: 2018-10-04 | End: 2019-06-26

## 2018-10-04 RX ORDER — HYDROCHLOROTHIAZIDE 25 MG/1
25 TABLET ORAL DAILY
Qty: 90 TAB | Refills: 3 | Status: SHIPPED | OUTPATIENT
Start: 2018-10-04 | End: 2019-06-26 | Stop reason: SDUPTHER

## 2018-10-04 NOTE — ASSESSMENT & PLAN NOTE
Patient reports increased stress at home.  She is in the process of a divorce and having to move out of her home.  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  I did offer to refer to counseling or start SSRI.  Patient declined.  She would like to work on meditation.  Denies SI/HI.

## 2018-10-04 NOTE — ASSESSMENT & PLAN NOTE
This is a chronic health problem that is well controlled with current medications and lifestyle measures.  Takes losartan 100 mg daily and hydrochlorothiazide 25 mg daily.  Tolerating medication, denies cough or lightheadedness.  Has had intermittent chest pain recently. See additional note under chest pain. Blood pressure at home has been running in 130's/70's.

## 2018-10-04 NOTE — ASSESSMENT & PLAN NOTE
Patient reports intermittent chest pain for the last several months.  She describes it as an electrical shock in the left side of her chest.  She reports occasional pain in her left arm and jaw.  Reports during episodes of chest pain that she does not have diaphoresis, nausea, or shortness of breath.  Reports pain is aggravated by lifting.  Alleviating factors are stretching and meditating.  She is not currently having chest pain.  Point-of-care EKG shows normal sinus rhythm.  Patient does admit that she has been under a lot of stress recently.  During exam pain is reproduced with left arm abduction.  This is likely muscular.  Did offer to get x-ray of shoulder.  Patient would like to hold off for now.  Strict ER precautions reviewed.
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
## 2018-10-04 NOTE — ASSESSMENT & PLAN NOTE
Patient continues to be poor historian in regard to her health history and answering questions related to her health concerns.  During exam she often brought up on related topics.

## 2018-10-04 NOTE — ASSESSMENT & PLAN NOTE
This is a chronic health problem that is uncontrolled with current medications and lifestyle measures.  POC A1C is 7.9 today.  Home fasting blood sugar readings have been ranging from 107-122.  Patient takes metformin 1000 mg twice a day.  A1c today is improved from last reading of 8.5.  Discussed with patient that the goal is for her A1c to be below 7.  Discussed options on how to achieve this including diet, routine exercise, weight loss, adding another medication.  Patient would like to work on lifestyle measures before considering adding another medication.  She will work on low carbohydrate diet, exercise, and weight loss.  She will follow-up in clinic with diabetic nurse educator and myself in 3 months.  She will get labs done prior to appointment.  Denies numbness and tingling, dysuria, polyuria, vision changes.

## 2019-02-28 ENCOUNTER — TELEPHONE (OUTPATIENT)
Dept: MEDICAL GROUP | Facility: PHYSICIAN GROUP | Age: 64
End: 2019-02-28

## 2019-02-28 NOTE — TELEPHONE ENCOUNTER
1. Caller Name: Belkis      Call Back Number: [phone] (home)         Patient approves a detailed voicemail message: no      Patient states that she is experiencing bilateral leg swelling and would like to be seen by PCP. Patient is scheduled to see PCP 3/4/19. 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 Patient also states that she feels like her BP maybe a bit higher than normal. Advised patient that if she feels like she needs to be sooner she can head into Urgent Care to be evaluated sooner. Went over medications with patient and advised her she is not due for a refill at this time.

## 2019-03-04 ENCOUNTER — OFFICE VISIT (OUTPATIENT)
Dept: MEDICAL GROUP | Facility: PHYSICIAN GROUP | Age: 64
End: 2019-03-04
Payer: MEDICARE

## 2019-03-04 VITALS
BODY MASS INDEX: 30.76 KG/M2 | HEART RATE: 95 BPM | TEMPERATURE: 97.9 F | SYSTOLIC BLOOD PRESSURE: 138 MMHG | RESPIRATION RATE: 16 BRPM | WEIGHT: 196 LBS | OXYGEN SATURATION: 98 % | HEIGHT: 67 IN | DIASTOLIC BLOOD PRESSURE: 70 MMHG

## 2019-03-04 DIAGNOSIS — F43.9 STRESS AT HOME: ICD-10-CM

## 2019-03-04 DIAGNOSIS — E11.9 TYPE 2 DIABETES MELLITUS WITHOUT COMPLICATION, WITHOUT LONG-TERM CURRENT USE OF INSULIN (HCC): ICD-10-CM

## 2019-03-04 DIAGNOSIS — K21.9 GASTROESOPHAGEAL REFLUX DISEASE, ESOPHAGITIS PRESENCE NOT SPECIFIED: ICD-10-CM

## 2019-03-04 DIAGNOSIS — I10 ESSENTIAL HYPERTENSION: ICD-10-CM

## 2019-03-04 DIAGNOSIS — M79.89 SOFT TISSUE MASS: ICD-10-CM

## 2019-03-04 DIAGNOSIS — R07.89 OTHER CHEST PAIN: ICD-10-CM

## 2019-03-04 PROCEDURE — 99214 OFFICE O/P EST MOD 30 MIN: CPT | Performed by: NURSE PRACTITIONER

## 2019-03-04 RX ORDER — OMEPRAZOLE 20 MG/1
20 CAPSULE, DELAYED RELEASE ORAL DAILY
Qty: 90 CAP | Refills: 3 | Status: SHIPPED | OUTPATIENT
Start: 2019-03-04 | End: 2019-06-26

## 2019-03-04 NOTE — PATIENT INSTRUCTIONS
Have you start omeprazole, 20 mg daily on empty stomach, first thing in am    Have your labs done as ordered at your last visit--they are fasting.    See me again in 6-8 weeks, sooner if needed    ER precautions    US of soft tissue on L leg
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
## 2019-03-04 NOTE — ASSESSMENT & PLAN NOTE
Pt has episodic chest pain off and on for the past several months, she was seen for this before--EKG in office was normal, no evidence of ischemia. It is brought on by stress. 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 She has been taking OTC antacids that stops the pain.   Her last episode was preceded by epistaxis. She feels her BP is quite elevated, but was normal in office today. She reports the highest BP she had at home was 140/100.   Patient also reports to me that this pain improves with antacids such as Tums.  She does not report any other associated symptoms such as shortness of breath, diaphoresis, peripheral edema or near syncope.  I did discuss with her that I do not feel this chest pain is cardiac in nature but more related to acid reflux since her symptoms improved with antacids.  We will have her start omeprazole 20 mg daily, she was advised to take this on an empty stomach apart from other medications.  She was also advised to avoid aggravating foods and activities.  We also discussed the importance of stress control, she states she is going to work on this through meditation and yoga, she declines pharmacotherapy for this.

## 2019-03-05 NOTE — ASSESSMENT & PLAN NOTE
This is a chronic condition, status of control unknown as she did not have labs done.  She is currently on metformin 1000 mg twice daily.  She is appropriately on ARB but not on statin.  Her last known A1c was 8.5%, we discussed at her last visit that we would like to get this below 7.  We discussed several treatment options including medication.  She is going to have her labs done before she follows up with me at her next appointment.
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
## 2019-03-05 NOTE — ASSESSMENT & PLAN NOTE
This is a chronic condition, stable and fairly well controlled on current medication.  She takes hydrochlorothiazide 25 mg daily and losartan 100 mg daily.  She does report increased blood pressure as of late that she is undergoing quite a bit of stress at home.  She states that her blood pressure has been elevated at 140s over 100s, however it is normal today at 138/70.  She does continue to have intermittent atypical chest pain, however she was recently seen for this and EKG in office was negative (see additional notes).

## 2019-03-09 ENCOUNTER — HOSPITAL ENCOUNTER (OUTPATIENT)
Dept: RADIOLOGY | Facility: MEDICAL CENTER | Age: 64
End: 2019-03-09
Attending: NURSE PRACTITIONER
Payer: MEDICARE

## 2019-03-09 DIAGNOSIS — M79.89 SOFT TISSUE MASS: ICD-10-CM

## 2019-03-09 PROCEDURE — 76882 US LMTD JT/FCL EVL NVASC XTR: CPT | Mod: LT

## 2019-03-11 ENCOUNTER — TELEPHONE (OUTPATIENT)
Dept: MEDICAL GROUP | Facility: PHYSICIAN GROUP | Age: 64
End: 2019-03-11

## 2019-03-11 NOTE — TELEPHONE ENCOUNTER
Phone Number Called: [phone]    Message: Patient notified. Patient is stating that its now behind her knee and swollen. Not hot to touch or no redness. Patient is staying of leg and she will start to elevate    Left Message for patient to call back: N\A

## 2019-03-11 NOTE — TELEPHONE ENCOUNTER
----- Message from JEWELL Hood sent at 3/11/2019  9:39 AM PDT -----  Please let pt know that US was negative--this most likely is a muscle

## 2019-04-10 ENCOUNTER — APPOINTMENT (OUTPATIENT)
Dept: MEDICAL GROUP | Facility: PHYSICIAN GROUP | Age: 64
End: 2019-04-10
Payer: MEDICARE

## 2019-05-13 DIAGNOSIS — Z79.4 TYPE 2 DIABETES MELLITUS WITH COMPLICATION, WITH LONG-TERM CURRENT USE OF INSULIN (HCC): ICD-10-CM

## 2019-05-13 DIAGNOSIS — E11.8 TYPE 2 DIABETES MELLITUS WITH COMPLICATION, WITH LONG-TERM CURRENT USE OF INSULIN (HCC): ICD-10-CM

## 2019-05-14 NOTE — TELEPHONE ENCOUNTER
Patient has recently been seen by PCP within the last 6 months per protocol (3/19). Will refill medications for 6 months.  Lab Results   Component Value Date/Time    HBA1C 7.9 10/04/2018 09:30 AM      Lab Results   Component Value Date/Time    MALBCRT see below 03/30/2018 06:37 AM    MICROALBUR <0.7 03/30/2018 06:37 AM      Lab Results   Component Value Date/Time    ALKPHOSPHAT 63 03/30/2018 06:38 AM    ASTSGOT 16 03/30/2018 06:38 AM    ALTSGPT 14 03/30/2018 06:38 AM    TBILIRUBIN 0.5 03/30/2018 06:38 AM

## 2019-05-14 NOTE — TELEPHONE ENCOUNTER
Was the patient seen in the last year in this department? Yes    Does patient have an active prescription for medications requested? No     Received Request Via: Pharmacy    Pt met protocol?: Yes     Last OV 03/04/19    Lab Results   Component Value Date/Time    HBA1C 7.9 10/04/2018 09:30 AM        Lab Results  Component Value Date/Time   CHOLSTRLTOT 206 (H) 03/30/2018 0638   TRIGLYCERIDE 304 (H) 03/30/2018 0638   HDL 43 03/30/2018 0638    (H) 03/30/2018 0638

## 2019-05-17 ENCOUNTER — TELEPHONE (OUTPATIENT)
Dept: MEDICAL GROUP | Facility: PHYSICIAN GROUP | Age: 64
End: 2019-05-17

## 2019-05-18 NOTE — TELEPHONE ENCOUNTER
Phone Number Called: [phone] (home)     Call outcome: left message for patient to call back regarding message below    Message: Spoke with patient earlier today and she was having issues with her medications. I called [facility] and they state that patient has refills on all her medications. They will get them ready for her to  st her convenience. Left voice message regarding this on pt telephone. Advised her that if she still has any issues to give our office a call on Monday.

## 2019-05-20 ENCOUNTER — HOSPITAL ENCOUNTER (OUTPATIENT)
Dept: LAB | Facility: MEDICAL CENTER | Age: 64
End: 2019-05-20
Attending: NURSE PRACTITIONER
Payer: MEDICARE

## 2019-05-20 DIAGNOSIS — Z79.4 TYPE 2 DIABETES MELLITUS WITH COMPLICATION, WITH LONG-TERM CURRENT USE OF INSULIN (HCC): ICD-10-CM

## 2019-05-20 DIAGNOSIS — E11.8 TYPE 2 DIABETES MELLITUS WITH COMPLICATION, WITH LONG-TERM CURRENT USE OF INSULIN (HCC): ICD-10-CM

## 2019-05-20 LAB
ALBUMIN SERPL BCP-MCNC: 3.8 G/DL (ref 3.2–4.9)
ALBUMIN/GLOB SERPL: 1.4 G/DL
ALP SERPL-CCNC: 88 U/L (ref 30–99)
ALT SERPL-CCNC: 12 U/L (ref 2–50)
ANION GAP SERPL CALC-SCNC: 9 MMOL/L (ref 0–11.9)
AST SERPL-CCNC: 16 U/L (ref 12–45)
BILIRUB SERPL-MCNC: 0.7 MG/DL (ref 0.1–1.5)
BUN SERPL-MCNC: 16 MG/DL (ref 8–22)
CALCIUM SERPL-MCNC: 9.5 MG/DL (ref 8.5–10.5)
CHLORIDE SERPL-SCNC: 102 MMOL/L (ref 96–112)
CHOLEST SERPL-MCNC: 240 MG/DL (ref 100–199)
CO2 SERPL-SCNC: 28 MMOL/L (ref 20–33)
CREAT SERPL-MCNC: 0.59 MG/DL (ref 0.5–1.4)
GLOBULIN SER CALC-MCNC: 2.8 G/DL (ref 1.9–3.5)
GLUCOSE SERPL-MCNC: 206 MG/DL (ref 65–99)
HDLC SERPL-MCNC: 46 MG/DL
LDLC SERPL CALC-MCNC: ABNORMAL MG/DL
POTASSIUM SERPL-SCNC: 4 MMOL/L (ref 3.6–5.5)
PROT SERPL-MCNC: 6.6 G/DL (ref 6–8.2)
SODIUM SERPL-SCNC: 139 MMOL/L (ref 135–145)
T4 FREE SERPL-MCNC: 1.05 NG/DL (ref 0.53–1.43)
TRIGL SERPL-MCNC: 443 MG/DL (ref 0–149)
TSH SERPL DL<=0.005 MIU/L-ACNC: 1.33 UIU/ML (ref 0.38–5.33)

## 2019-05-20 PROCEDURE — 82570 ASSAY OF URINE CREATININE: CPT

## 2019-05-20 PROCEDURE — 80061 LIPID PANEL: CPT

## 2019-05-20 PROCEDURE — 80053 COMPREHEN METABOLIC PANEL: CPT

## 2019-05-20 PROCEDURE — 82043 UR ALBUMIN QUANTITATIVE: CPT

## 2019-05-20 PROCEDURE — 84443 ASSAY THYROID STIM HORMONE: CPT

## 2019-05-20 PROCEDURE — 84439 ASSAY OF FREE THYROXINE: CPT

## 2019-05-20 PROCEDURE — 36415 COLL VENOUS BLD VENIPUNCTURE: CPT

## 2019-05-21 LAB
CREAT UR-MCNC: 111.8 MG/DL
MICROALBUMIN UR-MCNC: <0.7 MG/DL
MICROALBUMIN/CREAT UR: NORMAL MG/G (ref 0–30)

## 2019-05-28 ENCOUNTER — OFFICE VISIT (OUTPATIENT)
Dept: MEDICAL GROUP | Facility: PHYSICIAN GROUP | Age: 64
End: 2019-05-28
Payer: MEDICARE

## 2019-05-28 VITALS
OXYGEN SATURATION: 99 % | HEIGHT: 67 IN | HEART RATE: 94 BPM | WEIGHT: 202 LBS | SYSTOLIC BLOOD PRESSURE: 134 MMHG | BODY MASS INDEX: 31.71 KG/M2 | DIASTOLIC BLOOD PRESSURE: 80 MMHG | TEMPERATURE: 97.5 F | RESPIRATION RATE: 16 BRPM

## 2019-05-28 DIAGNOSIS — Z78.9 POOR HISTORIAN: ICD-10-CM

## 2019-05-28 DIAGNOSIS — I10 ESSENTIAL HYPERTENSION: ICD-10-CM

## 2019-05-28 DIAGNOSIS — E78.2 MIXED HYPERLIPIDEMIA: ICD-10-CM

## 2019-05-28 DIAGNOSIS — E11.9 TYPE 2 DIABETES MELLITUS WITHOUT COMPLICATION, WITHOUT LONG-TERM CURRENT USE OF INSULIN (HCC): ICD-10-CM

## 2019-05-28 DIAGNOSIS — F43.9 STRESS AT HOME: ICD-10-CM

## 2019-05-28 LAB
HBA1C MFR BLD: 9.6 % (ref 0–5.6)
INT CON NEG: ABNORMAL
INT CON POS: ABNORMAL

## 2019-05-28 PROCEDURE — 99214 OFFICE O/P EST MOD 30 MIN: CPT | Performed by: NURSE PRACTITIONER

## 2019-05-28 PROCEDURE — 83036 HEMOGLOBIN GLYCOSYLATED A1C: CPT | Performed by: NURSE PRACTITIONER

## 2019-05-28 RX ORDER — ROSUVASTATIN CALCIUM 20 MG/1
20 TABLET, COATED ORAL EVERY EVENING
Qty: 90 TAB | Refills: 1 | Status: SHIPPED | OUTPATIENT
Start: 2019-05-28 | End: 2019-06-26

## 2019-05-28 RX ORDER — PIOGLITAZONEHYDROCHLORIDE 30 MG/1
30 TABLET ORAL DAILY
Qty: 90 TAB | Refills: 1 | Status: SHIPPED | OUTPATIENT
Start: 2019-05-28 | End: 2019-06-26

## 2019-05-28 ASSESSMENT — PATIENT HEALTH QUESTIONNAIRE - PHQ9
SUM OF ALL RESPONSES TO PHQ QUESTIONS 1-9: 12
5. POOR APPETITE OR OVEREATING: 3 - NEARLY EVERY DAY
CLINICAL INTERPRETATION OF PHQ2 SCORE: 4
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
## 2019-05-28 NOTE — ASSESSMENT & PLAN NOTE
This continues, has very high stress. Still going through domestic problems and divorce. At last visit she declines referral BH or pharmacotherapy.  She also continues to decline referral to behavioral health as well as pharmacotherapy.  She states she does have a friend that is a counselor that she has talked to in the past, she is going to reach out to him to set up visits.

## 2019-05-28 NOTE — ASSESSMENT & PLAN NOTE
The 10-year ASCVD risk score (Iram BUSBY Jr., et al., 2013) is: 17.8%    Values used to calculate the score:      Age: 63 years      Sex: Female      Is Non- : No      Diabetic: Yes      Tobacco smoker: No      Systolic Blood Pressure: 134 mmHg      Is BP treated: Yes      HDL Cholesterol: 46 mg/dL      Total Cholesterol: 240 mg/dL  Patient does agree to starting statin, she will start rosuvastatin 20 mg daily.  She will follow-up with me in 3 months with labs done before visit.  She reports to me that she is going to make lifestyle modifications and try to eat healthier and exercise regularly.

## 2019-05-29 NOTE — ASSESSMENT & PLAN NOTE
This is chronic and stable, well controlled on medications including hydrochlorothiazide and losartan.  Blood pressure today is 134/80 and she denies symptoms of hypertension.

## 2019-05-29 NOTE — ASSESSMENT & PLAN NOTE
Patient continues to be a poor historian in regards to her health history and answering questions related to her health concerns.  She continues to insist on lifestyle modifications for her health including exercise including yoga, healthy diet.  I did discuss with her the risks of not treating it conditions adequately and that while lifestyle modifications are very important other treatments are necessary.

## 2019-05-29 NOTE — ASSESSMENT & PLAN NOTE
This is a chronic condition, uncontrolled.  She is currently on metformin 1000 mg twice a day.  A1c in office today is 9.6% it was 8.5% at her last visit.  At that time she wanted to continue with lifestyle modifications and did not want to make any medication changes.  Today she does agree to adding medication, she will stay on the metformin but we will add pioglitazone 30 mg daily.  She was advised to continue with lifestyle modifications.  She is now on statin (see additional notes).  She is also appropriately on ARB.  She is going to follow-up in 3 months with labs done before visit.

## 2019-05-30 ENCOUNTER — TELEPHONE (OUTPATIENT)
Dept: MEDICAL GROUP | Facility: PHYSICIAN GROUP | Age: 64
End: 2019-05-30

## 2019-05-30 NOTE — TELEPHONE ENCOUNTER
FINAL PRIOR AUTHORIZATION STATUS:    1.  Name of Medication & Dose: rosuvastatin 20 mg     2. Prior Auth Status: Approved through 12/30/18 to 12/30/19     3. Action Taken: Pharmacy Notified: yes Patient Notified: yes

## 2019-06-03 ENCOUNTER — TELEPHONE (OUTPATIENT)
Dept: MEDICAL GROUP | Facility: PHYSICIAN GROUP | Age: 64
End: 2019-06-03

## 2019-06-03 NOTE — TELEPHONE ENCOUNTER
FINAL PRIOR AUTHORIZATION STATUS:    1.  Name of Medication & Dose: rosuvastatin     2. Prior Auth Status: Approved through 12/30/18 to 12/31/19     3. Action Taken: Pharmacy Notified: yes Patient Notified: yes

## 2019-06-26 ENCOUNTER — OFFICE VISIT (OUTPATIENT)
Dept: MEDICAL GROUP | Facility: CLINIC | Age: 64
End: 2019-06-26
Payer: MEDICARE

## 2019-06-26 VITALS
BODY MASS INDEX: 30.79 KG/M2 | HEIGHT: 67 IN | HEART RATE: 90 BPM | WEIGHT: 196.2 LBS | TEMPERATURE: 97.9 F | OXYGEN SATURATION: 98 % | RESPIRATION RATE: 14 BRPM | DIASTOLIC BLOOD PRESSURE: 72 MMHG | SYSTOLIC BLOOD PRESSURE: 110 MMHG

## 2019-06-26 DIAGNOSIS — E11.9 TYPE 2 DIABETES MELLITUS WITHOUT COMPLICATION, WITHOUT LONG-TERM CURRENT USE OF INSULIN (HCC): ICD-10-CM

## 2019-06-26 DIAGNOSIS — Z12.39 BREAST CANCER SCREENING: ICD-10-CM

## 2019-06-26 DIAGNOSIS — M79.652 PAIN OF LEFT THIGH: ICD-10-CM

## 2019-06-26 DIAGNOSIS — I10 ESSENTIAL HYPERTENSION: ICD-10-CM

## 2019-06-26 PROBLEM — E66.3 OVERWEIGHT: Status: ACTIVE | Noted: 2019-06-26

## 2019-06-26 PROBLEM — K21.9 ACID REFLUX: Status: RESOLVED | Noted: 2019-03-04 | Resolved: 2019-06-26

## 2019-06-26 PROBLEM — R07.89 OTHER CHEST PAIN: Status: RESOLVED | Noted: 2018-10-04 | Resolved: 2019-06-26

## 2019-06-26 PROBLEM — M54.2 NECK PAIN: Status: RESOLVED | Noted: 2018-03-29 | Resolved: 2019-06-26

## 2019-06-26 PROCEDURE — 99214 OFFICE O/P EST MOD 30 MIN: CPT | Performed by: FAMILY MEDICINE

## 2019-06-26 RX ORDER — HYDROCHLOROTHIAZIDE 25 MG/1
25 TABLET ORAL DAILY
Qty: 90 TAB | Refills: 1 | Status: SHIPPED | OUTPATIENT
Start: 2019-06-26 | End: 2020-01-08

## 2019-06-26 NOTE — ASSESSMENT & PLAN NOTE
Took herself off her Actos. Thinks she can lose weight, improve her BS by going back on vegan diet. On metformin 1 g bid. Does not monitor her BS. No recent retinal exam. Denies any tingling or numbness in extretities. No rashes.
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
## 2019-07-31 ENCOUNTER — APPOINTMENT (OUTPATIENT)
Dept: URGENT CARE | Facility: PHYSICIAN GROUP | Age: 64
End: 2019-07-31
Payer: MEDICARE

## 2019-07-31 ENCOUNTER — APPOINTMENT (OUTPATIENT)
Dept: RADIOLOGY | Facility: IMAGING CENTER | Age: 64
End: 2019-07-31
Attending: FAMILY MEDICINE
Payer: MEDICARE

## 2019-07-31 ENCOUNTER — OFFICE VISIT (OUTPATIENT)
Dept: MEDICAL GROUP | Facility: CLINIC | Age: 64
End: 2019-07-31
Payer: MEDICARE

## 2019-07-31 VITALS
HEIGHT: 69 IN | BODY MASS INDEX: 29.03 KG/M2 | RESPIRATION RATE: 14 BRPM | SYSTOLIC BLOOD PRESSURE: 118 MMHG | OXYGEN SATURATION: 100 % | TEMPERATURE: 98.1 F | WEIGHT: 196 LBS | HEART RATE: 96 BPM | DIASTOLIC BLOOD PRESSURE: 72 MMHG

## 2019-07-31 DIAGNOSIS — M25.562 CHRONIC PAIN OF LEFT KNEE: ICD-10-CM

## 2019-07-31 DIAGNOSIS — G89.29 CHRONIC PAIN OF LEFT KNEE: ICD-10-CM

## 2019-07-31 DIAGNOSIS — E11.9 TYPE 2 DIABETES MELLITUS WITHOUT COMPLICATION, WITHOUT LONG-TERM CURRENT USE OF INSULIN (HCC): ICD-10-CM

## 2019-07-31 PROCEDURE — 73562 X-RAY EXAM OF KNEE 3: CPT | Mod: TC,LT | Performed by: FAMILY MEDICINE

## 2019-07-31 PROCEDURE — 99213 OFFICE O/P EST LOW 20 MIN: CPT | Performed by: FAMILY MEDICINE

## 2019-07-31 RX ORDER — LOSARTAN POTASSIUM 50 MG/1
100 TABLET ORAL
Refills: 1 | COMMUNITY
Start: 2019-05-20 | End: 2019-08-08 | Stop reason: SDUPTHER

## 2019-07-31 RX ORDER — NAPROXEN 500 MG/1
500 TABLET ORAL
Qty: 30 TAB | Refills: 2 | Status: SHIPPED | OUTPATIENT
Start: 2019-07-31 | End: 2019-09-25
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
## 2019-07-31 NOTE — ASSESSMENT & PLAN NOTE
Pain around left knee radiating up into the thigh at night, very uncomfortable. No knee swelling. Never had an XR.

## 2019-08-02 ENCOUNTER — TELEPHONE (OUTPATIENT)
Dept: MEDICAL GROUP | Facility: PHYSICIAN GROUP | Age: 64
End: 2019-08-02

## 2019-08-05 NOTE — PROGRESS NOTES
Telciom with patient. Reviewed XR report. :atient states that pain goes from buttock on left down to heel, gets worse during course of day. Will refer to get MRI  L-spine.

## 2019-08-06 DIAGNOSIS — M54.32 SCIATICA OF LEFT SIDE: ICD-10-CM

## 2019-08-08 ENCOUNTER — TELEPHONE (OUTPATIENT)
Dept: MEDICAL GROUP | Facility: CLINIC | Age: 64
End: 2019-08-08

## 2019-08-08 DIAGNOSIS — I10 ESSENTIAL HYPERTENSION: ICD-10-CM

## 2019-08-08 RX ORDER — LOSARTAN POTASSIUM 100 MG/1
100 TABLET ORAL DAILY
Qty: 30 TAB | Refills: 5 | Status: SHIPPED | OUTPATIENT
Start: 2019-08-08

## 2019-08-08 NOTE — TELEPHONE ENCOUNTER
Pt came in and stated that she is out of her Losartan 100mg  would like to know if you could please refill it she is worried that her blood pressure is to high

## 2019-08-16 ENCOUNTER — OFFICE VISIT (OUTPATIENT)
Dept: MEDICAL GROUP | Facility: CLINIC | Age: 64
End: 2019-08-16
Payer: MEDICARE

## 2019-08-16 VITALS
HEIGHT: 68 IN | DIASTOLIC BLOOD PRESSURE: 60 MMHG | OXYGEN SATURATION: 96 % | HEART RATE: 102 BPM | WEIGHT: 198 LBS | SYSTOLIC BLOOD PRESSURE: 100 MMHG | TEMPERATURE: 98.4 F | BODY MASS INDEX: 30.01 KG/M2

## 2019-08-16 DIAGNOSIS — M79.652 PAIN OF LEFT THIGH: ICD-10-CM

## 2019-08-16 PROCEDURE — 99213 OFFICE O/P EST LOW 20 MIN: CPT | Performed by: FAMILY MEDICINE
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
## 2019-08-28 ENCOUNTER — TELEPHONE (OUTPATIENT)
Dept: MEDICAL GROUP | Facility: CLINIC | Age: 64
End: 2019-08-28

## 2019-08-28 NOTE — TELEPHONE ENCOUNTER
1. Caller Name: Pt                      Call Back Number: [phone] (home)     2. Message: Pt came into the office because she is having a hard time getting into PT  In [place] and would like to have PT referral sent to another location not in [facility].      3. Patient approves office to leave a detailed voicemail/MyChart message: no

## 2019-09-25 ENCOUNTER — OFFICE VISIT (OUTPATIENT)
Dept: MEDICAL GROUP | Facility: CLINIC | Age: 64
End: 2019-09-25
Payer: MEDICARE

## 2019-09-25 VITALS
DIASTOLIC BLOOD PRESSURE: 80 MMHG | OXYGEN SATURATION: 96 % | WEIGHT: 198 LBS | SYSTOLIC BLOOD PRESSURE: 136 MMHG | HEIGHT: 67 IN | TEMPERATURE: 97.9 F | HEART RATE: 100 BPM | BODY MASS INDEX: 31.08 KG/M2

## 2019-09-25 DIAGNOSIS — R30.0 DYSURIA: ICD-10-CM

## 2019-09-25 DIAGNOSIS — R10.2 PELVIC PAIN: ICD-10-CM

## 2019-09-25 DIAGNOSIS — M54.2 NECK PAIN: ICD-10-CM

## 2019-09-25 LAB
APPEARANCE UR: CLEAR
BILIRUB UR STRIP-MCNC: NEGATIVE MG/DL
COLOR UR AUTO: YELLOW
GLUCOSE UR STRIP.AUTO-MCNC: NORMAL MG/DL
KETONES UR STRIP.AUTO-MCNC: NEGATIVE MG/DL
LEUKOCYTE ESTERASE UR QL STRIP.AUTO: NEGATIVE
NITRITE UR QL STRIP.AUTO: NEGATIVE
PH UR STRIP.AUTO: 5 [PH] (ref 5–8)
PROT UR QL STRIP: NEGATIVE MG/DL
RBC UR QL AUTO: NEGATIVE
SP GR UR STRIP.AUTO: 1.03
UROBILINOGEN UR STRIP-MCNC: NORMAL MG/DL

## 2019-09-25 PROCEDURE — 81002 URINALYSIS NONAUTO W/O SCOPE: CPT | Performed by: FAMILY MEDICINE

## 2019-09-25 PROCEDURE — 99214 OFFICE O/P EST MOD 30 MIN: CPT | Performed by: FAMILY MEDICINE
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
## 2019-09-25 NOTE — ASSESSMENT & PLAN NOTE
Ongoing pain left side of neck to shoulder. XR's in 3/18 showed only Anterolisthesis at C5-C6 and  Moderate degenerative disc disease and facet arthropathy mainly present at C4-C7. Feels cracking in neck when she turns and bends it . No pain or ting/numbness down arms. Would like PT to address, needs extra referral.
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
## 2019-11-20 ENCOUNTER — TELEPHONE (OUTPATIENT)
Dept: MEDICAL GROUP | Facility: CLINIC | Age: 64
End: 2019-11-20

## 2019-11-20 NOTE — TELEPHONE ENCOUNTER
Pt called stating that she cannot get into any of the offices that she has been referred to. She didn't state a specific office, but demanded that we find her a new Dr. I called her, and was sent to a voice mail, that wasn't accepting new voicemails.     Thank you.

## 2019-12-26 ENCOUNTER — OFFICE VISIT (OUTPATIENT)
Dept: MEDICAL GROUP | Facility: CLINIC | Age: 64
End: 2019-12-26
Payer: MEDICARE

## 2019-12-26 VITALS
HEIGHT: 67 IN | TEMPERATURE: 98.3 F | WEIGHT: 198 LBS | DIASTOLIC BLOOD PRESSURE: 72 MMHG | BODY MASS INDEX: 31.08 KG/M2 | HEART RATE: 106 BPM | SYSTOLIC BLOOD PRESSURE: 122 MMHG | OXYGEN SATURATION: 97 % | RESPIRATION RATE: 20 BRPM

## 2019-12-26 DIAGNOSIS — E66.9 OBESITY (BMI 30-39.9): ICD-10-CM

## 2019-12-26 DIAGNOSIS — E11.9 TYPE 2 DIABETES MELLITUS WITHOUT COMPLICATION, WITHOUT LONG-TERM CURRENT USE OF INSULIN (HCC): ICD-10-CM

## 2019-12-26 DIAGNOSIS — R09.81 CONGESTION OF NASAL SINUS: ICD-10-CM

## 2019-12-26 DIAGNOSIS — Z12.11 SCREENING FOR COLON CANCER: ICD-10-CM

## 2019-12-26 PROBLEM — E66.3 OVERWEIGHT: Status: RESOLVED | Noted: 2019-06-26 | Resolved: 2019-12-26

## 2019-12-26 PROBLEM — R30.0 DYSURIA: Status: RESOLVED | Noted: 2019-09-25 | Resolved: 2019-12-26

## 2019-12-26 LAB
HBA1C MFR BLD: 10.5 % (ref 0–5.6)
INT CON NEG: ABNORMAL
INT CON POS: ABNORMAL

## 2019-12-26 PROCEDURE — 99213 OFFICE O/P EST LOW 20 MIN: CPT | Performed by: NURSE PRACTITIONER

## 2019-12-26 PROCEDURE — 83036 HEMOGLOBIN GLYCOSYLATED A1C: CPT | Performed by: NURSE PRACTITIONER

## 2019-12-26 ASSESSMENT — PAIN SCALES - GENERAL: PAINLEVEL: NO PAIN

## 2019-12-26 NOTE — PROGRESS NOTES
Subjective:     Belkis Montoya is a 64 y.o. female here today for new onset upper respiratory symptoms     Belkis is a patient of Dr. Penaloza here for an acute concern.     This is a new problem. She reports she hasn't been feeling well for 2-3 days.   Symptoms include   Cough   Congestion       Patient denies the following symptoms:  SOB   Nausea/Vomiting   Diarrhea   Chills/body aches   Sore throat   Sinus pressure   Ear pain      Onset 2-3 days    Progression since onset: improving    Fever: No   Treatments tried: OTC meds, supplements    Family members/coworker sick with similar symptoms: No     Smoker status:   Social History     Tobacco Use   Smoking Status Former Smoker   • Packs/day: 0.00   • Years: 2.00   • Pack years: 0.00   • Types: Cigarettes   • Last attempt to quit: 1997   • Years since quittin.9   Smokeless Tobacco Never Used       No problem-specific Assessment & Plan notes found for this encounter.       Current medicines (including changes today)  Current Outpatient Medications   Medication Sig Dispense Refill   • losartan (COZAAR) 100 MG Tab Take 1 Tab by mouth every day. 30 Tab 5   • hydroCHLOROthiazide (HYDRODIURIL) 25 MG Tab Take 1 Tab by mouth every day. 90 Tab 1   • metformin (GLUCOPHAGE) 1000 MG tablet TAKE 1 TABLET BY MOUTH TWICE DAILY WITH MEALS 180 Tab 1     No current facility-administered medications for this visit.        She  has a past medical history of Diabetes (HCC), Headaches due to old head injury, Hypertension, and Lymphoma (HCC).    She  has a past surgical history that includes abdominal exploration () and hysterectomy, total abdominal.     Social History     Socioeconomic History   • Marital status: Single     Spouse name: Not on file   • Number of children: Not on file   • Years of education: Not on file   • Highest education level: Not on file   Occupational History   • Not on file   Social Needs   • Financial resource strain: Not on file   • Food insecurity:  "    Worry: Not on file     Inability: Not on file   • Transportation needs:     Medical: Not on file     Non-medical: Not on file   Tobacco Use   • Smoking status: Former Smoker     Packs/day: 0.00     Years: 2.00     Pack years: 0.00     Types: Cigarettes     Last attempt to quit: 1997     Years since quittin.9   • Smokeless tobacco: Never Used   Substance and Sexual Activity   • Alcohol use: No   • Drug use: No   • Sexual activity: Yes     Partners: Male   Lifestyle   • Physical activity:     Days per week: Not on file     Minutes per session: Not on file   • Stress: Not on file   Relationships   • Social connections:     Talks on phone: Not on file     Gets together: Not on file     Attends Taoist service: Not on file     Active member of club or organization: Not on file     Attends meetings of clubs or organizations: Not on file     Relationship status: Not on file   • Intimate partner violence:     Fear of current or ex partner: Not on file     Emotionally abused: Not on file     Physically abused: Not on file     Forced sexual activity: Not on file   Other Topics Concern   • Not on file   Social History Narrative   • Not on file       Family History   Problem Relation Age of Onset   • Cancer Mother    • Alzheimer's Disease Father    • Cancer Brother          ROS  Positive for cough, intermittent; congestion   No fever, chills, weight loss.   No rash.   No eye redness, eye pain.   Negative for SOB, wheezing.    No chest pain, palpitations, dizziness.   No abdominal pain.     All other systems reviewed and are negative.        Objective:     /72 (BP Location: Right arm, Patient Position: Sitting, BP Cuff Size: Adult)   Pulse (!) 106   Temp 36.8 °C (98.3 °F) (Temporal)   Resp 20   Ht 1.702 m (5' 7\")   Wt 89.8 kg (198 lb)   SpO2 97%  Body mass index is 31.01 kg/m².    Physical Exam:   Constitutional: Alert, no distress. Patient not toxic or lethargic.   Eye: Equal, round and reactive, " conjunctiva clear, lids normal.   ENMT: Lips without lesions, oropharynx clear.   TMs normal, without erythema.   No nasal drainage, normal appearance of external nose.   No pain with palpation of sinuses   Neck: Trachea midline, no masses. No cervical or supraclavicular lymphadenopathy  Respiratory: Unlabored respiratory effort, lungs clear to auscultation, no wheezes, no ronchi.  Cardiovascular: Normal S1, S2, no murmur, no edema.   Abdomen: Soft, non-tender, no masses, no hepatosplenomegaly. Normal bowel sounds.   Skin: Warm, dry, good turgor, no rashes in visible areas.  Psych: Alert and oriented x3, normal affect and mood.        Assessment and Plan:   The following treatment plan was discussed    1. Type 2 diabetes mellitus without complication, without long-term current use of insulin (HCC)  Patient's A1c is 10.5 today.  Most recent A1c in May 2019 was 9.7.  Certainly appears to be worsening.  She is currently taking metformin 1000 mg twice daily.  Discussed with patient this may be contributing to why she is not feeling well, headache.  She is not monitoring her blood pressure.  Discussed with patient to follow-up within 1 to 2 weeks with primary care provider to discuss care of treatment for her diabetes.  She verbalized understanding.  - POCT  A1C    2. Screening for colon cancer  - OCCULT BLOOD FECES IMMUNOASSAY (FIT); Future    3. Obesity (BMI 30-39.9)  - Patient identified as having weight management issue.  Appropriate orders and counseling given.    4. Congestion of nasal sinus  Appears viral.  Discussed conservative treatment.  Good handwashing and rest.  She will return to clinic if symptoms worsen.       Advised patient to rest, increase fluids   Sinus rinse as needed for congestion   Salt water gargle for sore throat as needed   Discussed s/s to seek emergent care.  RTC if symptoms worsen or do not resolve.       Reviewed risks and benefits of treatment plan. Patient verbally agrees to plan of  care.       Followup: Return if symptoms worsen or fail to improve, for make an appt to follow up with PCP re: VALE .    MARIA ELENA [email or web address].     PLEASE NOTE: This dictation was created using voice recognition software. I have made every reasonable attempt to correct obvious errors, but I expect that there may be errors of grammar and possibly content that I did not discover prior finalizing this note.

## 2019-12-26 NOTE — PATIENT INSTRUCTIONS
Your medical care was provided today by: CARLOS Ayala    Thank You for the opportunity to serve you.    You may receive a brief survey in the mail shortly regarding your visit today. Please take a few moments to complete the survey and return it; no postage is necessary. We are working to serve our patient population better, improve customer service and our patients overall experience and your input can help us to accomplish this. We thank you for your help and for the opportunity to serve you today and in the future.     Labs and Diagnostic Testing   Please note that if we have ordered labs or diagnostic testing, those results may be released to you on [employer] prior to my review. While we do our best to review your results as soon as possible, there are times when you may see your results prior to provider review. Of course, if you ever have any questions or concerns about your results, please contact our clinic.     Special Instructions:  Always call 9-1-1 immediately if you develop a life threatening emergency.    Unless told otherwise please take all medications as directed and complete prescription therapies.     Watch for the following signs that require additional evaluation: progressive lethargy or unresponsiveness, localized pain (chest, abdomen), shortness of breath, painful breathing, progressive vomiting with weakness, bloody stools, or new rash.     If you are prescribed pain medication or any other medication that is sedating, do not take medication before or while operating a vehicle or heavy machinery or equipment due to potential side effects such as drowsiness and/or dizziness.

## 2020-01-08 ENCOUNTER — OFFICE VISIT (OUTPATIENT)
Dept: MEDICAL GROUP | Facility: CLINIC | Age: 65
End: 2020-01-08
Payer: MEDICARE

## 2020-01-08 VITALS
DIASTOLIC BLOOD PRESSURE: 72 MMHG | SYSTOLIC BLOOD PRESSURE: 114 MMHG | HEIGHT: 68 IN | TEMPERATURE: 96.9 F | WEIGHT: 199 LBS | BODY MASS INDEX: 30.16 KG/M2 | OXYGEN SATURATION: 99 % | HEART RATE: 87 BPM

## 2020-01-08 DIAGNOSIS — Z79.4 TYPE 2 DIABETES MELLITUS WITH COMPLICATION, WITH LONG-TERM CURRENT USE OF INSULIN (HCC): ICD-10-CM

## 2020-01-08 DIAGNOSIS — E11.8 TYPE 2 DIABETES MELLITUS WITH COMPLICATION, WITH LONG-TERM CURRENT USE OF INSULIN (HCC): ICD-10-CM

## 2020-01-08 DIAGNOSIS — E11.9 TYPE 2 DIABETES MELLITUS WITHOUT COMPLICATION, WITHOUT LONG-TERM CURRENT USE OF INSULIN (HCC): ICD-10-CM

## 2020-01-08 PROCEDURE — 99213 OFFICE O/P EST LOW 20 MIN: CPT | Performed by: FAMILY MEDICINE
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
## 2020-01-08 NOTE — ASSESSMENT & PLAN NOTE
Has not been taking her metformin regularly. Last A1c was 10.5. Overwhelmed with care of her friend, who has a degenerative neurological disorder, whose behavior is getting worse and requires lots of care.

## 2020-05-01 DIAGNOSIS — E11.8 TYPE 2 DIABETES MELLITUS WITH COMPLICATION, WITH LONG-TERM CURRENT USE OF INSULIN (HCC): ICD-10-CM

## 2020-05-01 DIAGNOSIS — Z79.4 TYPE 2 DIABETES MELLITUS WITH COMPLICATION, WITH LONG-TERM CURRENT USE OF INSULIN (HCC): ICD-10-CM

## 2020-05-01 NOTE — TELEPHONE ENCOUNTER
Was the patient seen in the last year in this department? Yes    Does patient have an active prescription for medications requested? Yes    Received Request Via: Pharmacy    Office Visit on 12/26/2019   Component Date Value   • Glycohemoglobin 12/26/2019 10.5*   Office Visit on 09/25/2019   Component Date Value   • POC Color 09/25/2019 yellow    • POC Appearance 09/25/2019 clear    • POC Leukocyte Esterase 09/25/2019 negative    • POC Nitrites 09/25/2019 negative    • POC Urobiligen 09/25/2019 0.2 E. U.U    • POC Protein 09/25/2019 negative    • POC Urine PH 09/25/2019 5.0    • POC Blood 09/25/2019 negative    • POC Specific Gravity 09/25/2019 1.030    • POC Ketones 09/25/2019 negative    • POC Bilirubin 09/25/2019 negative    • POC Glucose 09/25/2019 500 mg/dl    Office Visit on 05/28/2019   Component Date Value   • Glycohemoglobin 05/28/2019 9.6*   Hospital Outpatient Visit on 05/20/2019   Component Date Value   • Sodium 05/20/2019 139    • Potassium 05/20/2019 4.0    • Chloride 05/20/2019 102    • Co2 05/20/2019 28    • Anion Gap 05/20/2019 9.0    • Glucose 05/20/2019 206*   • Bun 05/20/2019 16    • Creatinine 05/20/2019 0.59    • Calcium 05/20/2019 9.5    • AST(SGOT) 05/20/2019 16    • ALT(SGPT) 05/20/2019 12    • Alkaline Phosphatase 05/20/2019 88    • Total Bilirubin 05/20/2019 0.7    • Albumin 05/20/2019 3.8    • Total Protein 05/20/2019 6.6    • Globulin 05/20/2019 2.8    • A-G Ratio 05/20/2019 1.4    • Cholesterol,Tot 05/20/2019 240*   • Triglycerides 05/20/2019 443*   • HDL 05/20/2019 46    • LDL 05/20/2019 see below    • Creatinine, Urine 05/20/2019 111.80    • Microalbumin, Urine Rand* 05/20/2019 <0.7    • Micro Alb Creat Ratio 05/20/2019 see below    • TSH 05/20/2019 1.330    • Free T-4 05/20/2019 1.05    • GFR If  05/20/2019 >60    • GFR If Non  [employer]meri* 05/20/2019 >60    ]

## 2022-03-14 ENCOUNTER — APPOINTMENT (OUTPATIENT)
Dept: URGENT CARE | Facility: PHYSICIAN GROUP | Age: 67
End: 2022-03-14
Payer: COMMERCIAL
